# Patient Record
Sex: FEMALE | Race: WHITE | Employment: FULL TIME | URBAN - METROPOLITAN AREA
[De-identification: names, ages, dates, MRNs, and addresses within clinical notes are randomized per-mention and may not be internally consistent; named-entity substitution may affect disease eponyms.]

---

## 2021-04-09 ENCOUNTER — HOSPITAL ENCOUNTER (EMERGENCY)
Age: 26
Discharge: HOME | End: 2021-04-09
Attending: EMERGENCY MEDICINE
Payer: COMMERCIAL

## 2021-04-09 ENCOUNTER — APPOINTMENT (OUTPATIENT)
Dept: GENERAL RADIOLOGY | Age: 26
End: 2021-04-09
Payer: COMMERCIAL

## 2021-04-09 VITALS
OXYGEN SATURATION: 99 % | TEMPERATURE: 97.5 F | RESPIRATION RATE: 20 BRPM | HEART RATE: 71 BPM | SYSTOLIC BLOOD PRESSURE: 140 MMHG | DIASTOLIC BLOOD PRESSURE: 101 MMHG

## 2021-04-09 DIAGNOSIS — F41.0 ANXIETY ATTACK: Primary | ICD-10-CM

## 2021-04-09 PROCEDURE — 6370000000 HC RX 637 (ALT 250 FOR IP): Performed by: STUDENT IN AN ORGANIZED HEALTH CARE EDUCATION/TRAINING PROGRAM

## 2021-04-09 PROCEDURE — 99283 EMERGENCY DEPT VISIT LOW MDM: CPT

## 2021-04-09 PROCEDURE — 71045 X-RAY EXAM CHEST 1 VIEW: CPT

## 2021-04-09 PROCEDURE — 93005 ELECTROCARDIOGRAM TRACING: CPT | Performed by: STUDENT IN AN ORGANIZED HEALTH CARE EDUCATION/TRAINING PROGRAM

## 2021-04-09 RX ORDER — LORAZEPAM 0.5 MG/1
0.5 TABLET ORAL ONCE
Status: COMPLETED | OUTPATIENT
Start: 2021-04-09 | End: 2021-04-09

## 2021-04-09 RX ADMIN — LORAZEPAM 0.5 MG: 0.5 TABLET ORAL at 12:27

## 2021-04-09 ASSESSMENT — ENCOUNTER SYMPTOMS
BACK PAIN: 0
COUGH: 0
NAUSEA: 1
DIARRHEA: 0
ABDOMINAL PAIN: 0
SHORTNESS OF BREATH: 1
VOMITING: 0

## 2021-04-09 NOTE — ED PROVIDER NOTES
(abnormal) and her pulse is 108. Her respiration is 20 and oxygen saturation is 99%. Lungs are clear to auscultation bilateral, heart tachycardic with a regular rhythm, abdomen is soft nontender    Impression: Shortness of breath, hyperventilation    Plan: EKG, chest x-ray      EKG Interpretation    Interpreted by me  Sinus tachycardia ventricular heart rate, normal TX interval, normal QRS duration, no acute ST or T wave changes noted      (Please note that portions of this note were completed with a voice recognition program.  Efforts were made to edit the dictations but occasionally words are mis-transcribed.)    Ok Young MD, [facility]  Attending Emergency Medicine Physician        Cindy Christensen MD  04/09/21 2623

## 2021-04-09 NOTE — ED PROVIDER NOTES
[facility]  ED  Emergency Department Encounter  Emergency Medicine Resident     Pt Name: Nasrin Hernandez  MRN: [id number]  [place] 1995  Date of evaluation: 4/9/21  PCP:  Aren Branch MD    CHIEF COMPLAINT       Chief Complaint   Patient presents with    Chest Pain    Anxiety       HISTORY OFPRESENT ILLNESS  (Location/Symptom, Timing/Onset, Context/Setting, Quality, Duration, Modifying Sayda Simone.)      Nasrin Hernandez is a 23 y.o. female with past medical history asthma and anxiety presents to emergency department by EMS with complaint of diffuse body tingling/numbness and weakness as well as shortness of breath and chest pain. Patient states she was driving her car when she had onset of symptoms, she was able to finish driving home. When she got out of her car she was states she was too weak to walk into her house and fell to the street and then called EMS. Patient admitts to history of panic attacks but states this episode was unlike prior panic attacks. PAST MEDICAL / SURGICAL / SOCIAL / FAMILY HISTORY      has no past medical history on file. has no past surgical history on file.     Social History     Socioeconomic History    Marital status: Single     Spouse name: Not on file    Number of children: Not on file    Years of education: Not on file    Highest education level: Not on file   Occupational History    Not on file   Social Needs    Financial resource strain: Not on file    Food insecurity     Worry: Not on file     Inability: Not on file    Transportation needs     Medical: Not on file     Non-medical: Not on file   Tobacco Use    Smoking status: Not on file   Substance and Sexual Activity    Alcohol use: Not on file    Drug use: Not on file    Sexual activity: Not on file   Lifestyle    Physical activity     Days per week: Not on file     Minutes per session: Not on file    Stress: Not on file   Relationships    Social connections Talks on phone: Not on file     Gets together: Not on file     Attends Latter day service: Not on file     Active member of club or organization: Not on file     Attends meetings of clubs or organizations: Not on file     Relationship status: Not on file    Intimate partner violence     Fear of current or ex partner: Not on file     Emotionally abused: Not on file     Physically abused: Not on file     Forced sexual activity: Not on file   Other Topics Concern    Not on file   Social History Narrative    Not on file       No family history on file. Allergies:  Patient has no allergy information on record. Home Medications:  Prior to Admission medications    Not on File       REVIEW OF SYSTEMS    (2-9 systems for level 4, 10 or more for level 5)      Review of Systems   Constitutional: Negative for chills, fatigue and fever. Eyes: Negative for visual disturbance. Respiratory: Positive for shortness of breath. Negative for cough. Cardiovascular: Positive for chest pain. Gastrointestinal: Positive for nausea. Negative for abdominal pain, diarrhea and vomiting. Genitourinary: Negative for dysuria. Musculoskeletal: Negative for back pain and neck pain. Neurological: Positive for weakness and numbness. Negative for syncope, speech difficulty and headaches. Psychiatric/Behavioral: Negative for confusion. The patient is nervous/anxious. PHYSICAL EXAM   (up to 7 for level 4, 8 or more for level 5)     INITIAL VITALS:    temperature is 97.5 °F (36.4 °C). Her blood pressure is 140/101 (abnormal) and her pulse is 71. Her respiration is 20 and oxygen saturation is 99%. Physical Exam  Constitutional:       Comments: Patient is very anxious appearing upon entrance to room, tearful   HENT:      Nose: Nose normal.      Mouth/Throat:      Mouth: Mucous membranes are moist.      Pharynx: Oropharynx is clear. No oropharyngeal exudate. Eyes:      Extraocular Movements: Extraocular movements intact. Pupils: Pupils are equal, round, and reactive to light. Neck:      Musculoskeletal: Normal range of motion. No neck rigidity or muscular tenderness. Cardiovascular:      Rate and Rhythm: Normal rate and regular rhythm. Heart sounds: No murmur. Pulmonary:      Effort: Pulmonary effort is normal.      Breath sounds: Normal breath sounds. No wheezing. Musculoskeletal:         General: No tenderness or signs of injury. Right lower leg: No edema. Left lower leg: No edema. Skin:     General: Skin is warm and dry. Capillary Refill: Capillary refill takes less than 2 seconds. Findings: No lesion. Neurological:      Mental Status: She is alert. Comments: Patient is alert and oriented to person, place, time. GCS 15. Patient has pupillary reflex present bilaterally, intact extraocular motions intact bilaterally. Patient complains of subjective numbness left forehead and right cheek, symmetrical smile, tongue with intact range of motion, palate elevates symmetrically, facial muscles symmetric bilaterally. 5/5 strength bilateral upper and lower extremities. Patient reports sensation of numbness in bilateral hands   Psychiatric:      Comments: Patient is very anxious tearful         DIFFERENTIAL  DIAGNOSIS     PLAN (LABS / IMAGING / EKG):  Orders Placed This Encounter   Procedures    XR CHEST PORTABLE    EKG 12 Lead       MEDICATIONS ORDERED:  Orders Placed This Encounter   Medications    LORazepam (ATIVAN) tablet 0.5 mg       DDX: Anxiety, angina, ACS, asthma exacerbation, PE, bronchitis    Initial MDM/Plan: 23 y.o. female who presents with complaints of chest pain shortness of breath that started acutely while driving her car. She does have a history of asthma as well as anxiety, has had panic attacks in the past but states this feels unsimilar to prior episodes. Patient seen and examined.   She is tachycardic and tachypneic, is tearful and anxious appearing upon entrance history of panic attacks but states this episode was unlike prior panic attacks. Patient is anxious upon entrance to room, tearful, tachypneic.     [DS]   1229 Patient tachycardic, slightly tachypneic upon entrance to room. Does appear anxious. Patient has carpopedal spasm present bilaterally, subjective numbness in left forehead and right cheek. Suspect most likely anxiety attack, will perform EKG and chest x-ray, give dose of Ativan and reassess. [DS]   8754 Upon reassessment patient appears more comfortable, heart rate in the low 70s, oxygen saturation 98% on room air. EKG originally revealed sinus tachycardia, tachycardia has now resolved. [DS]   2231 Chest x-ray unremarkable, patient appears much more comfortable. Normal sinus rhythm, normotensive, will discharge home. [DS]      ED Course User Index  [DS] Hair Estrella DO         PROCEDURES:  None    CONSULTS:  None    CRITICAL CARE:  Please see attending note    FINAL IMPRESSION      1. Anxiety attack          DISPOSITION / PLAN     DISPOSITION Decision To Discharge 04/09/2021 02:50:23 PM        PATIENTREFERRED TO:  Pérez Kirkland MD  [address] [place], [place] [phone]    Call in 3 days  For recheck      DISCHARGE MEDICATIONS:  There are no discharge medications for this patient.       Hair Estrella DO  EmergencyMedicine Resident    (Please note that portions of this note were completed with a voice recognition program.  Efforts were made to edit the dictations but occasionally words are mis-transcribed.)        Hair Estrella DO  Resident  04/09/21 6196

## 2021-04-09 NOTE — ED NOTES
Bed: 35  Expected date:   Expected time:   Means of arrival:   Comments:  [address] [address], RN  04/09/21 1200

## 2021-04-10 LAB
EKG ATRIAL RATE: 108 BPM
EKG P AXIS: 71 DEGREES
EKG P-R INTERVAL: 112 MS
EKG Q-T INTERVAL: 324 MS
EKG QRS DURATION: 74 MS
EKG QTC CALCULATION (BAZETT): 434 MS
EKG R AXIS: 66 DEGREES
EKG T AXIS: 61 DEGREES
EKG VENTRICULAR RATE: 108 BPM

## 2021-07-15 ENCOUNTER — HOSPITAL ENCOUNTER (INPATIENT)
Age: 26
LOS: 3 days | Discharge: HOME | DRG: 885 | End: 2021-07-18
Attending: EMERGENCY MEDICINE | Admitting: PSYCHIATRY & NEUROLOGY
Payer: COMMERCIAL

## 2021-07-15 DIAGNOSIS — F32.A DEPRESSION WITH SUICIDAL IDEATION: Primary | ICD-10-CM

## 2021-07-15 DIAGNOSIS — R45.851 DEPRESSION WITH SUICIDAL IDEATION: Primary | ICD-10-CM

## 2021-07-15 LAB
ABSOLUTE EOS #: 0.1 K/UL (ref 0–0.4)
ABSOLUTE IMMATURE GRANULOCYTE: NORMAL K/UL (ref 0–0.3)
ABSOLUTE LYMPH #: 2.7 K/UL (ref 1–4.8)
ABSOLUTE MONO #: 0.4 K/UL (ref 0.1–1.3)
ACETAMINOPHEN LEVEL: <5 UG/ML (ref 10–30)
ALBUMIN SERPL-MCNC: 4.7 G/DL (ref 3.5–5.2)
ALBUMIN/GLOBULIN RATIO: ABNORMAL (ref 1–2.5)
ALP BLD-CCNC: 65 U/L (ref 35–104)
ALT SERPL-CCNC: 21 U/L (ref 5–33)
ANION GAP SERPL CALCULATED.3IONS-SCNC: 14 MMOL/L (ref 9–17)
AST SERPL-CCNC: 28 U/L
BASOPHILS # BLD: 1 % (ref 0–2)
BASOPHILS ABSOLUTE: 0.1 K/UL (ref 0–0.2)
BILIRUB SERPL-MCNC: 0.24 MG/DL (ref 0.3–1.2)
BUN BLDV-MCNC: 10 MG/DL (ref 6–20)
BUN/CREAT BLD: ABNORMAL (ref 9–20)
CALCIUM SERPL-MCNC: 9.2 MG/DL (ref 8.6–10.4)
CHLORIDE BLD-SCNC: 108 MMOL/L (ref 98–107)
CO2: 21 MMOL/L (ref 20–31)
CREAT SERPL-MCNC: 0.75 MG/DL (ref 0.5–0.9)
DIFFERENTIAL TYPE: NORMAL
EOSINOPHILS RELATIVE PERCENT: 1 % (ref 0–4)
ETHANOL PERCENT: 0.19 %
ETHANOL: 187 MG/DL
GFR AFRICAN AMERICAN: >60 ML/MIN
GFR NON-AFRICAN AMERICAN: >60 ML/MIN
GFR SERPL CREATININE-BSD FRML MDRD: ABNORMAL ML/MIN/{1.73_M2}
GFR SERPL CREATININE-BSD FRML MDRD: ABNORMAL ML/MIN/{1.73_M2}
GLUCOSE BLD-MCNC: 88 MG/DL (ref 70–99)
HCG QUALITATIVE: NEGATIVE
HCT VFR BLD CALC: 42.1 % (ref 36–46)
HEMOGLOBIN: 14.4 G/DL (ref 12–16)
IMMATURE GRANULOCYTES: NORMAL %
LYMPHOCYTES # BLD: 34 % (ref 24–44)
MCH RBC QN AUTO: 33.8 PG (ref 26–34)
MCHC RBC AUTO-ENTMCNC: 34.2 G/DL (ref 31–37)
MCV RBC AUTO: 98.9 FL (ref 80–100)
MONOCYTES # BLD: 6 % (ref 1–7)
NRBC AUTOMATED: NORMAL PER 100 WBC
PDW BLD-RTO: 11.9 % (ref 11.5–14.9)
PLATELET # BLD: 338 K/UL (ref 150–450)
PLATELET ESTIMATE: NORMAL
PMV BLD AUTO: 7.5 FL (ref 6–12)
POTASSIUM SERPL-SCNC: 3.7 MMOL/L (ref 3.7–5.3)
RBC # BLD: 4.26 M/UL (ref 4–5.2)
RBC # BLD: NORMAL 10*6/UL
SALICYLATE LEVEL: <1 MG/DL (ref 3–10)
SARS-COV-2, RAPID: NOT DETECTED
SEG NEUTROPHILS: 58 % (ref 36–66)
SEGMENTED NEUTROPHILS ABSOLUTE COUNT: 4.5 K/UL (ref 1.3–9.1)
SODIUM BLD-SCNC: 143 MMOL/L (ref 135–144)
SPECIMEN DESCRIPTION: NORMAL
TOTAL PROTEIN: 7.7 G/DL (ref 6.4–8.3)
WBC # BLD: 7.7 K/UL (ref 3.5–11)
WBC # BLD: NORMAL 10*3/UL

## 2021-07-15 PROCEDURE — 80179 DRUG ASSAY SALICYLATE: CPT

## 2021-07-15 PROCEDURE — 80143 DRUG ASSAY ACETAMINOPHEN: CPT

## 2021-07-15 PROCEDURE — 80053 COMPREHEN METABOLIC PANEL: CPT

## 2021-07-15 PROCEDURE — 6370000000 HC RX 637 (ALT 250 FOR IP): Performed by: EMERGENCY MEDICINE

## 2021-07-15 PROCEDURE — 85025 COMPLETE CBC W/AUTO DIFF WBC: CPT

## 2021-07-15 PROCEDURE — 84703 CHORIONIC GONADOTROPIN ASSAY: CPT

## 2021-07-15 PROCEDURE — 93005 ELECTROCARDIOGRAM TRACING: CPT | Performed by: EMERGENCY MEDICINE

## 2021-07-15 PROCEDURE — 99285 EMERGENCY DEPT VISIT HI MDM: CPT

## 2021-07-15 PROCEDURE — 87635 SARS-COV-2 COVID-19 AMP PRB: CPT

## 2021-07-15 PROCEDURE — 80307 DRUG TEST PRSMV CHEM ANLYZR: CPT

## 2021-07-15 PROCEDURE — G0480 DRUG TEST DEF 1-7 CLASSES: HCPCS

## 2021-07-15 PROCEDURE — 1240000000 HC EMOTIONAL WELLNESS R&B

## 2021-07-15 RX ORDER — POLYETHYLENE GLYCOL 3350 17 G/17G
17 POWDER, FOR SOLUTION ORAL DAILY PRN
Status: DISCONTINUED | OUTPATIENT
Start: 2021-07-15 | End: 2021-07-18 | Stop reason: HOSPADM

## 2021-07-15 RX ORDER — ONDANSETRON 4 MG/1
4 TABLET, ORALLY DISINTEGRATING ORAL ONCE
Status: COMPLETED | OUTPATIENT
Start: 2021-07-15 | End: 2021-07-15

## 2021-07-15 RX ORDER — ACETAMINOPHEN 325 MG/1
650 TABLET ORAL EVERY 4 HOURS PRN
Status: DISCONTINUED | OUTPATIENT
Start: 2021-07-15 | End: 2021-07-18 | Stop reason: HOSPADM

## 2021-07-15 RX ORDER — CETIRIZINE HYDROCHLORIDE 10 MG/1
10 TABLET ORAL DAILY
Status: ON HOLD | COMMUNITY
End: 2021-07-18 | Stop reason: HOSPADM

## 2021-07-15 RX ORDER — TRAZODONE HYDROCHLORIDE 50 MG/1
50 TABLET ORAL NIGHTLY PRN
Status: DISCONTINUED | OUTPATIENT
Start: 2021-07-16 | End: 2021-07-18 | Stop reason: HOSPADM

## 2021-07-15 RX ORDER — DICYCLOMINE HYDROCHLORIDE 10 MG/1
10 CAPSULE ORAL 2 TIMES DAILY
Status: ON HOLD | COMMUNITY
End: 2021-07-18 | Stop reason: HOSPADM

## 2021-07-15 RX ORDER — ALBUTEROL SULFATE 90 UG/1
2 AEROSOL, METERED RESPIRATORY (INHALATION) EVERY 6 HOURS PRN
COMMUNITY

## 2021-07-15 RX ORDER — IBUPROFEN 400 MG/1
400 TABLET ORAL EVERY 6 HOURS PRN
Status: DISCONTINUED | OUTPATIENT
Start: 2021-07-15 | End: 2021-07-18 | Stop reason: HOSPADM

## 2021-07-15 RX ORDER — MAGNESIUM HYDROXIDE/ALUMINUM HYDROXICE/SIMETHICONE 120; 1200; 1200 MG/30ML; MG/30ML; MG/30ML
30 SUSPENSION ORAL EVERY 6 HOURS PRN
Status: DISCONTINUED | OUTPATIENT
Start: 2021-07-15 | End: 2021-07-18 | Stop reason: HOSPADM

## 2021-07-15 RX ORDER — AMITRIPTYLINE HYDROCHLORIDE 25 MG/1
25 TABLET, FILM COATED ORAL NIGHTLY
Status: ON HOLD | COMMUNITY
End: 2021-07-18 | Stop reason: HOSPADM

## 2021-07-15 RX ORDER — HYDROXYZINE 50 MG/1
50 TABLET, FILM COATED ORAL 3 TIMES DAILY PRN
Status: DISCONTINUED | OUTPATIENT
Start: 2021-07-15 | End: 2021-07-18 | Stop reason: HOSPADM

## 2021-07-15 RX ADMIN — ONDANSETRON 4 MG: 4 TABLET, ORALLY DISINTEGRATING ORAL at 17:58

## 2021-07-15 NOTE — ED PROVIDER NOTES
EMERGENCY DEPARTMENT ENCOUNTER    Pt Name: Eleuterio Moura  MRN: [id number]  [place] 1995  Date of evaluation: 7/15/21  CHIEF COMPLAINT       Chief Complaint   Patient presents with    Mental Health Problem     HISTORY OF PRESENT ILLNESS     Mental Health Problem  Presenting symptoms: suicide attempt    Presenting symptoms comment:  Took four zanaflex pills after her boyfriend broke up with her, then she immediately vomited them up  Patient accompanied by:  [place] enforcement  Onset quality:  Sudden  Timing:  Constant  Chronicity:  New  Context comment:  Marijuana  Treatment compliance:  Untreated  Relieved by:  Nothing  Worsened by:  Nothing  Ineffective treatments:  None tried  Associated symptoms: anxiety and poor judgment    she did drink alcohol today        REVIEW OF SYSTEMS     Review of Systems   Psychiatric/Behavioral: The patient is nervous/anxious. All other systems reviewed and are negative. PASTMEDICAL HISTORY     Past Medical History:   Diagnosis Date    Anxiety     Asthma     IBS (irritable bowel syndrome)      Past Problem List  There is no problem list on file for this patient. SURGICAL HISTORY       Past Surgical History:   Procedure Laterality Date    WISDOM TOOTH EXTRACTION       CURRENT MEDICATIONS       Previous Medications    ALBUTEROL SULFATE HFA (VENTOLIN HFA) 108 (90 BASE) MCG/ACT INHALER    Inhale 2 puffs into the lungs every 6 hours as needed for Wheezing    AMITRIPTYLINE (ELAVIL) 25 MG TABLET    Take 25 mg by mouth nightly    CETIRIZINE (ZYRTEC) 10 MG TABLET    Take 10 mg by mouth daily    DICYCLOMINE (BENTYL) 10 MG CAPSULE    Take 10 mg by mouth 2 times daily     ALLERGIES     has No Known Allergies. FAMILY HISTORY     has no family status information on file. SOCIAL HISTORY       Social History     Tobacco Use    Smoking status: Never Smoker    Smokeless tobacco: Never Used   Substance Use Topics    Alcohol use: Yes     Comment: social     Drug use:  Yes Types: Marijuana     PHYSICAL EXAM     INITIAL VITALS: BP (!) 127/90   Pulse 115   Temp 98 °F (36.7 °C) (Oral)   Resp 15   SpO2 98%    Physical Exam  Constitutional:       General: She is not in acute distress. Appearance: Normal appearance. She is well-developed. She is not diaphoretic. HENT:      Head: Normocephalic and atraumatic. Right Ear: External ear normal.      Left Ear: External ear normal.      Nose: Nose normal. No congestion. Mouth/Throat:      Mouth: Mucous membranes are moist.      Pharynx: Oropharynx is clear. Eyes:      General:         Right eye: No discharge. Left eye: No discharge. Conjunctiva/sclera: Conjunctivae normal.      Pupils: Pupils are equal, round, and reactive to light. Neck:      Trachea: No tracheal deviation. Cardiovascular:      Rate and Rhythm: Normal rate and regular rhythm. Pulses: Normal pulses. Heart sounds: Normal heart sounds. Pulmonary:      Effort: Pulmonary effort is normal. No respiratory distress. Breath sounds: Normal breath sounds. No stridor. No wheezing or rales. Abdominal:      Palpations: Abdomen is soft. Tenderness: There is no abdominal tenderness. There is no guarding or rebound. Musculoskeletal:         General: No tenderness or deformity. Normal range of motion. Cervical back: Normal range of motion and neck supple. Skin:     General: Skin is warm and dry. Capillary Refill: Capillary refill takes less than 2 seconds. Findings: No erythema or rash. Neurological:      General: No focal deficit present. Mental Status: She is alert and oriented to person, place, and time. Cranial Nerves: No cranial nerve deficit. Coordination: Coordination normal.   Psychiatric:         Mood and Affect: Mood normal.         Behavior: Behavior normal.         Thought Content:  Thought content normal.         Judgment: Judgment normal.         MEDICAL DECISION MAKING:   Reviewed labs  5:47 PM EDT  poison center, patient is clear from their standpoint for [facility]  6:03 PM EDT  Medically clear for [facility] admit       Procedures    DIAGNOSTIC RESULTS   EKG:All EKG's are interpreted by the Emergency Department Physician who either signs or Co-signs this chart in the absence of a cardiologist.  ST, rate 105, normal intervals, no ischemic st changes    LABS: All lab results were reviewed by myself, and all abnormals are listed below. Labs Reviewed   ACETAMINOPHEN LEVEL - Abnormal; Notable for the following components:       Result Value    Acetaminophen Level <5 (*)     All other components within normal limits   COMPREHENSIVE METABOLIC PANEL - Abnormal; Notable for the following components:    Chloride 108 (*)     Total Bilirubin 0.24 (*)     All other components within normal limits   ETHANOL - Abnormal; Notable for the following components:    Ethanol 187 (*)     All other components within normal limits   SALICYLATE LEVEL - Abnormal; Notable for the following components:    Salicylate Lvl <1 (*)     All other components within normal limits   COVID-19, RAPID   CBC WITH AUTO DIFFERENTIAL   HCG, SERUM, QUALITATIVE   URINE DRUG SCREEN       EMERGENCY DEPARTMENTCOURSE:         Vitals:    Vitals:    07/15/21 1627   BP: (!) 127/90   Pulse: 115   Resp: 15   Temp: 98 °F (36.7 °C)   TempSrc: Oral   SpO2: 98%       The patient was given the following medications while in the emergency department:  Orders Placed This Encounter   Medications    ondansetron (ZOFRAN-ODT) disintegrating tablet 4 mg       FINAL IMPRESSION      1. Depression with suicidal ideation          DISPOSITION/PLAN   DISPOSITION Decision To Admit 07/15/2021 05:18:52 PM      PATIENT REFERRED TO:  No follow-up provider specified.   DISCHARGE MEDICATIONS:  New Prescriptions    No medications on file     Ernestina Grimaldo MD  Attending Emergency Physician                    Ernestina Grimaldo MD  07/15/21 0669

## 2021-07-15 NOTE — ED TRIAGE NOTES
Mode of arrival (squad #, walk in, police, etc) : Police        Chief complaint(s): Mental health problem         Arrival Note (brief scenario, treatment PTA, etc). : Pt states that her boyfriend broke up with her. Pt states the relationship was very toxic and she is very upset. Pt states that she took 7 muscle relaxers that are prescribed to her in an attempt to hurt herself. She states she did have an episode of emesis right after taking them. The pt states that her father called the police due to her saying \"I'm done. \" Pt is tearful in triage. Pt is A&Ox4, in no acute distress, respirations even and unlabored, ambulatory with steady gait. C= \"Have you ever felt that you should Cut down on your drinking? \"  No  A= \"Have people Annoyed you by criticizing your drinking? \"  No  G= \"Have you ever felt bad or Guilty about your drinking? \"  No  E= \"Have you ever had a drink as an Eye-opener first thing in the morning to steady your nerves or to help a hangover? \"  No      Deferred []      Reason for deferring: N/A    *If yes to two or more: probable alcohol abuse. *

## 2021-07-15 NOTE — PROGRESS NOTES
Medication History completed: All medications added this encounter. Medications confirmed with [address] [place] [address]. The patient reports that she has not taken her dicyclomine or amitriptyline in several days due to nightmares. The patient reports cannabis use with last use today.      Thank you,  Kade Portillo, PharmD, BCPS  [phone]

## 2021-07-16 PROBLEM — F33.2 MAJOR DEPRESSIVE DISORDER, RECURRENT SEVERE WITHOUT PSYCHOTIC FEATURES (HCC): Status: ACTIVE | Noted: 2021-07-16

## 2021-07-16 LAB
EKG ATRIAL RATE: 105 BPM
EKG P AXIS: 64 DEGREES
EKG P-R INTERVAL: 126 MS
EKG Q-T INTERVAL: 340 MS
EKG QRS DURATION: 78 MS
EKG QTC CALCULATION (BAZETT): 449 MS
EKG R AXIS: 57 DEGREES
EKG T AXIS: 66 DEGREES
EKG VENTRICULAR RATE: 105 BPM

## 2021-07-16 PROCEDURE — 93010 ELECTROCARDIOGRAM REPORT: CPT | Performed by: INTERNAL MEDICINE

## 2021-07-16 PROCEDURE — 99223 1ST HOSP IP/OBS HIGH 75: CPT | Performed by: PSYCHIATRY & NEUROLOGY

## 2021-07-16 PROCEDURE — APPSS60 APP SPLIT SHARED TIME 46-60 MINUTES

## 2021-07-16 PROCEDURE — 6370000000 HC RX 637 (ALT 250 FOR IP): Performed by: PSYCHIATRY & NEUROLOGY

## 2021-07-16 PROCEDURE — 6370000000 HC RX 637 (ALT 250 FOR IP)

## 2021-07-16 PROCEDURE — 1240000000 HC EMOTIONAL WELLNESS R&B

## 2021-07-16 RX ORDER — ALBUTEROL SULFATE 90 UG/1
2 AEROSOL, METERED RESPIRATORY (INHALATION) EVERY 6 HOURS PRN
Status: DISCONTINUED | OUTPATIENT
Start: 2021-07-16 | End: 2021-07-18 | Stop reason: HOSPADM

## 2021-07-16 RX ORDER — BUSPIRONE HYDROCHLORIDE 10 MG/1
10 TABLET ORAL 2 TIMES DAILY
Status: DISCONTINUED | OUTPATIENT
Start: 2021-07-16 | End: 2021-07-18 | Stop reason: HOSPADM

## 2021-07-16 RX ADMIN — HYDROXYZINE HYDROCHLORIDE 50 MG: 50 TABLET, FILM COATED ORAL at 09:04

## 2021-07-16 RX ADMIN — IBUPROFEN 400 MG: 400 TABLET, FILM COATED ORAL at 09:33

## 2021-07-16 RX ADMIN — ALBUTEROL SULFATE 2 PUFF: 90 AEROSOL, METERED RESPIRATORY (INHALATION) at 16:59

## 2021-07-16 RX ADMIN — BUSPIRONE HYDROCHLORIDE 10 MG: 10 TABLET ORAL at 21:38

## 2021-07-16 RX ADMIN — TRAZODONE HYDROCHLORIDE 50 MG: 50 TABLET ORAL at 21:38

## 2021-07-16 RX ADMIN — ALBUTEROL SULFATE 2 PUFF: 90 AEROSOL, METERED RESPIRATORY (INHALATION) at 21:54

## 2021-07-16 ASSESSMENT — PAIN DESCRIPTION - PAIN TYPE: TYPE: ACUTE PAIN

## 2021-07-16 ASSESSMENT — SLEEP AND FATIGUE QUESTIONNAIRES
SLEEP PATTERN: RESTLESSNESS
DO YOU HAVE DIFFICULTY SLEEPING: YES
AVERAGE NUMBER OF SLEEP HOURS: 5
DO YOU USE A SLEEP AID: NO

## 2021-07-16 ASSESSMENT — PAIN SCALES - GENERAL: PAINLEVEL_OUTOF10: 6

## 2021-07-16 ASSESSMENT — PAIN DESCRIPTION - DESCRIPTORS: DESCRIPTORS: CRAMPING

## 2021-07-16 ASSESSMENT — LIFESTYLE VARIABLES
HISTORY_ALCOHOL_USE: YES
HISTORY_ALCOHOL_USE: NO

## 2021-07-16 ASSESSMENT — PAIN DESCRIPTION - LOCATION: LOCATION: ABDOMEN

## 2021-07-16 NOTE — PROGRESS NOTES
Behavioral Services  Medicare Certification Upon Admission    I certify that this patient's inpatient psychiatric hospital admission is medically necessary for:    [x] (1) Treatment which could reasonably be expected to improve this patient's condition,       [x] (2) Or for diagnostic study;     AND     [x](2) The inpatient psychiatric services are provided while the individual is under the care of a physician and are included in the individualized plan of care.     Estimated length of stay/service -5 to 9 days    Plan for post-hospital care -outpatient care    Electronically signed by Dominga Chisholm MD on 7/16/2021 at 1:37 PM

## 2021-07-16 NOTE — GROUP NOTE
Group Therapy Note    Date: 7/16/2021    Group Start Time: 1100  Group End Time: 4718  Group Topic: Cognitive Skills    [facility]    Eulalia Quintanilla, IVELISSES        Group Therapy Note    Attendees:7         Patient's Goal:  To irmprove decision making skills improve interactions with peers    Notes:   Pt was pleasant and participated well     Status After Intervention:  Improved    Participation Level:  Active Listener and Interactive    Participation Quality: Appropriate, Attentive, Sharing and Supportive      Speech:  normal      Thought Process/Content: Logical      Affective Functioning: Congruent      Mood: euthymic      Level of consciousness:  Alert, Oriented x4 and Attentive      Response to Learning: Able to verbalize current knowledge/experience, Able to verbalize/acknowledge new learning and Progressing to goal      Endings: None Reported    Modes of Intervention: Education, Support and Problem-solving      Discipline Responsible: Psychoeducational Specialist      Signature:  Annie Moura

## 2021-07-16 NOTE — PLAN OF CARE
[facility]  Initial Interdisciplinary Treatment Plan NO      Original treatment plan Date & Time: 7/16/2021 0800    Admission Type:  Admission Type: Voluntary    Reason for admission:   Reason for Admission: OD on muscle relaxants, drinking alcohol    Estimated Length of Stay:  5-7days  Estimated Discharge Date: to be determined by physician    PATIENT STRENGTHS:  Patient Strengths:Strengths: Communication, Employment, Positive Support, Social Skills, No significant Physical Illness  Patient Strengths and Limitations:Limitations: External locus of control  Addictive Behavior: Addictive Behavior  In the past 3 months, have you felt or has someone told you that you have a problem with:  : None  Do you have a history of Chemical Use?: No  Do you have a history of Alcohol Use?: No  Do you have a history of Street Drug Abuse?: Yes  Histroy of Prescripton Drug Abuse?: No  Medical Problems:  Past Medical History:   Diagnosis Date    Anxiety     Asthma     IBS (irritable bowel syndrome)      Status EXAM:Status and Exam  Normal: Yes  Facial Expression: Flat  Affect: Appropriate  Level of Consciousness: Alert  Mood:Normal: Yes  Motor Activity:Normal: Yes  Interview Behavior: Cooperative  Preception: [place] to Person, [place] to Time, [place] to Place, [place] to Situation  Attention:Normal: Yes  Thought Content:Normal: Yes  Hallucinations: None  Delusions: No  Memory:Normal: Yes  Insight and Judgment: No  Insight and Judgment: Poor Judgment, Poor Insight  Present Suicidal Ideation: No  Present Homicidal Ideation: No    EDUCATION:   Learner Progress Toward Treatment Goals: reviewed group plans and strategies for care    Method:group therapy, medication compliance, individualized assessments and care planning    Outcome: needs reinforcement    PATIENT GOALS: to be discussed with patient within 72 hours    PLAN/TREATMENT RECOMMENDATIONS:     continue group therapy , medications compliance, goal setting, individualized assessments and care, continue to monitor pt on unit      SHORT-TERM GOALS:   Time frame for Short-Term Goals: 5-7 days    LONG-TERM GOALS:  Time frame for Long-Term Goals: 6 months  Members Present in Team Meeting: See Signature Sheet    ROSA Peacock

## 2021-07-16 NOTE — GROUP NOTE
Group Therapy Note    Date: 7/16/2021    Group Start Time: 1330  Group End Time: 3794  Group Topic: Recreational    [facility] CHARISMAI ANETTE Power, CTRS        Group Therapy Note    Attendees: 5         Patient's Goal: To increase interpersonal interactions. To increase creative thinking. Notes:  Patient attended group and actively participated. Patient was appropriate and pleasant. Status After Intervention:  Improved    Participation Level:  Active Listener and Interactive    Participation Quality: Appropriate, Attentive and Sharing      Speech:  normal      Thought Process/Content: Logical      Affective Functioning: Congruent      Mood: euthymic      Level of consciousness:  Alert, Oriented x4 and Attentive      Response to Learning: Able to verbalize current knowledge/experience and Progressing to goal      Endings: None Reported    Modes of Intervention: Support, Socialization, Activity, Media and Reality-testing      Discipline Responsible: Psychoeducational Specialist      Signature:  Maine Israel

## 2021-07-16 NOTE — CARE COORDINATION
[facility] Biopsychosocial Assessment    Current Level of Psychosocial Functioning     Independent   Dependent    Minimal Assist X    Psychosocial High Risk Factors (check all that apply)    Unable to obtain meds   Chronic illness/pain    Substance abuse X Marijuana   Lack of Family Support   Financial stress   Isolation X  Inadequate Community Resources X  Suicide attempt(s) X   Not taking medications X   Victim of crime   Developmental Delay  Unable to manage personal needs  X   Age 72 or older   Homeless  No transportation   Readmission within 30 days  Unemployment  Traumatic Event     Psychiatric Advanced Directives: none reported     Family to Involve in Treatment: Pt reports that both her mother and father are supportive and involved in her care. Sexual Orientation: Heterosexual      Patient Strengths:employed full time, insurance, family support    Patient Barriers: presents on admission following an attempted overdose on muscle relaxer's, not linked with HC, not receiving Psychiatric medications. .   Opiate Education Provided:N/A Pt denies and does not have a documented history of Opiate or Heroin use/abuse. Pt reports Marijuana and Alcohol use. CMHC/mental health history: Pt is not [place] [facility], she is agreeable to being linked with a [facility] in the area at discharge. Plan of Care   medication management, group/individual therapies, family meetings, psycho -education, treatment team meetings to assist with stabilization    Initial Discharge Plan: Pt reports a plan to return to her home in [place]. Pt also reports a plan to follow up with outpatient Treatment at a [facility] [place] the area at discharge. Clinical Summary:  Patient is a 23year old  female who presents on admission [place]  Enforcement after she told her father \"I'm done\". Patient also reported that she took 7 muscle relaxer's.   Patient was intoxicated upon arrival, however upon sobriety, patient is very tearful and upset regarding recent break up with her boyfriend. Patient did report throwing the medication up after ingestion. Pt Ethanol level on admission to ED was . 187.      Patient denies ever having suicidal thoughts or attempting to harm herself. She indicated that she took the pills to \"try and make him care\" (referring to her ex boyfriend). Patient stated she was frustrated and hurt and wanted to get his attention. Patient denies any past suicide attempts. Patient denies hallucinations. Patient lives with her roommate and feels safe with this person. Patient also has supportive family, and reports that both of her mother and father are supportive of her. 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 Patient denies symptoms of depression, states that the overdose attempt, was just a way to get her boyfriend to care about her. Patient states that she took Flexeril, 5 mg tablets approximately 7 of them while drinking vodka and cranberry juice. Patient's BAL upon arrival to the hospital was 0.187. Throughout the conversation, patient continues to deny symptoms of depression.   Pt is not linked with a [facility], she is agreeable to being linked with a [facility] in the area at discharge.

## 2021-07-16 NOTE — GROUP NOTE
Group Therapy Note    Date: 7/16/2021    Group Start Time: 1000  Group End Time: 5641  Group Topic: Psychotherapy    [facility]    Dorian Tan    Client is met on this date for either new client assessment or 1:1 talk time. Motivational Interviewing used as well as encouragement to participate in ongoing groups.

## 2021-07-16 NOTE — H&P
Department of Psychiatry  Attending Physician Psychiatric Assessment     Reason for Admission to Psychiatric Unit:    · Threat to self requiring 24 hour professional observation  · Acute disordered/bizarre behavior or psychomotor agitation or retardation;interferes with ADLs so that patient cannot function at a less intensive care level of care during evaluation and treatment   · A mental disorder causing major disability in social, interpersonal, occupational, and/or educational functioning that is leading to dangerous or life-threatening functioning, and that can only be addressed in an acute inpatient setting   · Concerns about patient's safety in the community    CHIEF COMPLAINT: Depression with suicidal ideation    History obtained from:  patient, electronic medical record and family members    HISTORY OF PRESENT ILLNESS:    Miguel Jara is a 23 y.o. female with significant past medical history of anxiety who presented to the ED post overdose attempt on Flexeril. Per ED report \"Patient is a 23year old  female who presented to ED via [address] after she told her father \"I'm done\". Patient also informed ED she took 7 muscle relaxer's in an attempt to harm herself. Patient was intoxicated upon arrival, however upon sobriety, patient is very tearful and upset regarding recent break up. Patient stated her ex boyfriend \"told me not to, so I did\", in regards to taking the pills. She indicated it \"was stupid but I was pissed\". Patient did report throwing the medication up after ingestion.   Patient denies SI upon sobriety. She indicated that she took the pills to \"try and make him care\" (referring to her ex boyfriend). Patient stated she was frustrated and hurt and had \"a break down\". Patient denies SI and has never attempted suicide before. Patient denies HI. Patient denies halluciantions.   Patient stated she did not take the medications in an attempt to harm or kill herself.   Patient lives with her roommate and feels safe with this person. She feels as though this person is \"someone she can always trust\". Patient also has supportive family, and stated her father is \"my best friend\". 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 Patient reports she has great coworkers and administration staff. Patient is worried about missing work due to [employer] a stupid mistake\". Upon arrival to the unit, patient has been behavioral controlled. Patient has not required the use of any as needed medication for agitation or anxiety. Today, patient was interviewed in the intake room. Patient was cooperative with writer. Patient states that she stayed over at her boyfriend's last night, and that when she woke up this morning her boyfriend said that she should just take her stuff and go home. Patient states that her boyfriend wanted her out of the house and stated \"we should just be friends from here on out\". Patient states that they have been dated for approximately 8 months, but understands that he is not a really good person and that she should probably get out of the relationship. Patient denies symptoms of depression, states that the overdose attempt, was just a way to get her boyfriend to care about her. Patient states that she took Flexeril, 5 mg tablets approximately 7 of them while drinking vodka and cranberry juice. Patient's BAL upon arrival to the hospital was 0.187. Throughout the conversation, patient continues to deny symptoms of depression. Patient does endorse anxiety. States that her current anxiety level is a 6 out of 10 on a daily basis. Patient states that there are certain things that we will set her off and that she is good at talking herself down, however states that today she was not able to do that.  Patient states that she recently was given medication for anxiety, states that she cannot remember the name of the medications however states that she stopped taking it because it was giving her hallucinations. Patient states that she follows with Dr. Neli De La Torre. Patient states that she does get panic attacks and that she was recently admitted to the hospital for hyperventilating during a panic attack. Patient endorses symptoms of borderline personality disorder such as fears abandonment, unstable relationships, intense angry outbursts, labile mood and impulsivity. Patient states that while she was in high school she was bullied patient also states that while she was in high school that her class made a play about her with all of her bad qualities that the entire high school knew that the play was about her. At this time, patient is discharged focused, states that this was a stupid mistake, however admits that she took the medication as an attention seeking behavior to get her boyfriend to care about her. PSYCHIATRIC HISTORY:  yes -anxiety  Currently follows with her PCP  0 lifetime suicide attempts  0 psychiatric hospital admissions    Past psychiatric medications includes: Unable to remember the name    Adverse reactions from psychotropic medications: yes -states that she got auditory hallucinations from anxiety medication    Lifetime Psychiatric Review of Systems         Slime or Hypomania: denies      Panic Attacks: Endorses     Phobias: denies     Obsessions and Compulsions:denies     Body or Vocal Tics:  denies     Hallucinations:denies     Delusions: persecutory    Past Medical History:        Diagnosis Date    Anxiety     Asthma     IBS (irritable bowel syndrome)        Past Surgical History:        Procedure Laterality Date    WISDOM TOOTH EXTRACTION         Allergies:  Patient has no known allergies. Social History:     [employer].  LEVEL OF EDUCATION: High school diploma  MARITAL STATUS: Previously in a relationship with a gentleman for approximately 8 months, recently broke up. CHILDREN: None  OCCUPATION: Works as a  at a healthcare facility  RESIDENCE: Currently lives in her own apartment with a roommate  PATIENT ASSETS: Stable housing, supportive family    DRUG USE HISTORY  Social History     Tobacco Use   Smoking Status Never Smoker   Smokeless Tobacco Never Used     Social History     Substance and Sexual Activity   Alcohol Use Yes    Comment: social      Social History     Substance and Sexual Activity   Drug Use Yes    Types: Marijuana     Alcohol use, denies daily drinking, states that she typically only drinks on the weekends    States that she uses marijuana daily for sleep and appetite      LEGAL HISTORY:   HISTORY OF INCARCERATION: no     Family History:   History reviewed. No pertinent family history. Psychiatric Family History  No insight    PHYSICAL EXAM:  Vitals:  BP (!) 152/97   Pulse 88   Temp 98.7 °F (37.1 °C) (Oral)   Resp 16   Ht 5' 6\" (1.676 m)   Wt 190 lb (86.2 kg)   SpO2 98%   BMI 30.67 kg/m²      Review of Systems   Constitutional: Negative for chills and weight loss. HENT: Negative for ear pain and nosebleeds. Eyes: Negative for blurred vision and photophobia. Respiratory: Negative for cough, shortness of breath and wheezing. Cardiovascular: Negative for chest pain and palpitations. Gastrointestinal: Negative for abdominal pain, diarrhea and vomiting. Genitourinary: Negative for dysuria and urgency. Musculoskeletal: Negative for falls and joint pain. Skin: Negative for itching and rash. Neurological: Negative for tremors, seizures and weakness. Endo/Heme/Allergies: Does not bruise/bleed easily. Physical Exam:      Constitutional:  Appears well-developed and well-nourished, no acute distress  HENT:   Head: Normocephalic and atraumatic. Eyes: Conjunctivae are normal. Right eye exhibits no discharge. Left eye exhibits no discharge. No scleral icterus.    Neck: Normal range of motion. Neck supple. Pulmonary/Chest:  No respiratory distress or accessory muscle use, no wheezing. Abdominal: Soft. Exhibits no distension. Musculoskeletal: Normal range of motion. Exhibits no edema. Neurological: cranial nerves II-XII grossly in tact, normal gait and station  Skin: Skin is warm and dry. Patient is not diaphoretic. No erythema.          Mental Status Examination:    Level of consciousness:  within normal limits   Appearance:  Hospital attire, seated on the side of bed, fair grooming   Behavior/Motor: no abnormalities noted  Attitude toward examiner:  Cooperative  Speech: normal rate and volume  Mood:  Depressed  Affect:  blunted  Thought processes:   Circumstantial  Thought content: active suicidal ideations without current plan or intent               denies homicidal ideations               Denies hallucinations              denies delusions  Cognition:  Oriented to self, location, time, situation  Concentration clinically adequate  Memory: intact  Insight &Judgment: poor    DSM-5 Diagnosis  Major depressive disorder, recurrent, severe, without psychosis  Generalized anxiety disorder  Cannabis use disorder    Psychosocial and Contextual factors:  Financial  Occupational  Relationship  Legal   Living situation  Educational     Past Medical History:   Diagnosis Date    Anxiety     Asthma     IBS (irritable bowel syndrome)         TREATMENT PLAN  Start Prozac 10 mg  Attempt to develop insight  Psycho-education conducted  Supportive Therapy conducted  Follow up daily while on inpatient unit  Encourage patient to participate in milieu activities    Risk Management:  close watch per standard protocol      Psychotherapy:  participation in milieu and group and individual sessions with Attending Physician,  and Physician Assistant/CNP      Estimated length of stay:  2-14 days      GENERAL PATIENT/FAMILY EDUCATION  Patient will understand basic signs and symptoms, Patient will understand benefits/risks and potential side effects from proposed meds and Patient will understand their role in recovery. Family is  active in patient's care. Patient assets that may be helpful during treatment include: Intent to participate and engage in treatment, sufficient fund of knowledge and intellect to understand and utilize treatments. Goals:    1) Remission of suicidal ideations  2) Stabilization of symptoms prior to discharge. 3) Establish efficacy and tolerability of medications. Behavioral Services  Medicare Certification     Admission Day 1  I certify that this patient's inpatient psychiatric hospital admission is medically necessary for:    x (1) treatment which could reasonably be expected to improve this patient's condition, or    x (2) diagnostic study or its equivalent. Time Spent: 60 minutes     Physicians Signature:  Electronically signed by ALEIDA Jama CNP on 7/16/21 at 3:13 AM EDT  I independently saw and evaluated the patient. I reviewed the midlevel provider's documentation above. Any additional comments or changes to the midlevel provider's documentation are stated below otherwise agree with assessment. The patient reports that she is generally happy person but she has anxiety problems. The patient recently had a difficult break-up. Her boyfriend broke up with her and told her not to do anything stupid. She decided to take an overdose to go against his advice. She took 7 muscle relaxants that she has left. 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 She likes her job. The patient uses marijuana often to help with sleep. She does not use other recreational substances. Her alcohol use is social    The patient has been treated with Zoloft in the past.  She got hallucinations from this.     The patient denies any past history of suicide attempts or admissions to hospital.    The patient would like medications for anxiety but she wants to avoid the medication that because of side effects in the past.    The patient states that she had an IUD which is recently been removed. She has been bleeding heavily. We will start the patient on buspirone 10 mg twice daily. She can use Atarax as needed for anxiety. Will avoid SSRIs due to previous adverse reaction. PLAN  Medications as noted above  Attempt to develop insight  Psycho-education conducted. Supportive Therapy conducted.   Probable discharge is 5 to 7 days  Follow-up daily while on inpatient unit    Electronically signed by Tai Martins MD on 7/16/21 at 1:38 PM EDT

## 2021-07-16 NOTE — PLAN OF CARE
Problem: Depressive Behavior With or Without Suicide Precautions:  Goal: Able to verbalize and/or display a decrease in depressive symptoms  Description: Able to verbalize and/or display a decrease in depressive symptoms  7/16/2021 1716 by Cassie Claudio. Cameron Ramirez LPN  Outcome: Ongoing   Chayo Wang is seen In the dayroom, affect is flat but mood is brightened, Denies any suicidal thoughts, reports some anxiety. Denies any issues with sleep or appetite. States she was just upset over breakup with boyfriend, has good family support.  15 minute safety check continued

## 2021-07-16 NOTE — ED NOTES
Provisional Diagnosis:     Patient presented to ED via [employer] for a suicide attempt    Psychosocial and Contextual Factors:     Patient recently broke up with boyfriend    C-SSRS Summary:    Patient denies upon sobriety    Patient: X  Family:   Agency:     Substance Abuse:  Patient reports marijuana use to help her sleep and increase appetite. Present Suicidal Behavior:    Patient reports she took 7 muscle relaxer's in an attempt to harm herself. Patient also told her father \"I'm done\". Upon sobriety, patient denies SI. Verbal: X    Attempt: X    Past Suicidal Behavior:   Patient denies    Verbal:    Attempt:    Self-Injurious/Self-Mutilation:  Patient denies    Trauma Identified:    Patient recently broke up with her boyfriend    Protective Factors:    Patient has supportive friends and family. Patient has stable housing. Patient has stable employment. Patient has insurance. Risk Factors:    Patient going through break up. Patient not linked with [address] provider. Patient not taking medications. Clinical Summary:    Patient is a 23year old  female who presented to ED via [facility] after she told her father \"I'm done\". Patient also informed ED she took 7 muscle relaxer's in an attempt to harm herself. Patient was intoxicated upon arrival, however upon sobriety, patient is very tearful and upset regarding recent break up. Patient stated her ex boyfriend \"told me not to, so I did\", in regards to taking the pills. She indicated it \"was stupid but I was pissed\". Patient did report throwing the medication up after ingestion. Patient denies SI upon sobriety. She indicated that she took the pills to \"try and make him care\" (referring to her ex boyfriend). Patient stated she was frustrated and hurt and had \"a break down\". Patient denies SI and has never attempted suicide before. Patient denies HI. Patient denies halluciantions.   Patient stated she did not take the medications in an attempt to harm or kill herself. Patient lives with her roommate and feels safe with this person. She feels as though this person is \"someone she can always trust\". Patient also has supportive family, and stated her father is \"my best friend\". 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 Patient reports she has great coworkers and administration staff. Patient is worried about missing work due to [employer] a stupid mistake\". Level of Care Disposition: This writer consulted with Radha Sandoval NP, who recommended inpatient hospitalization for safety and stabilization. Patient signed application for voluntary admission to [facility].

## 2021-07-17 PROCEDURE — 6370000000 HC RX 637 (ALT 250 FOR IP): Performed by: PSYCHIATRY & NEUROLOGY

## 2021-07-17 PROCEDURE — 99232 SBSQ HOSP IP/OBS MODERATE 35: CPT | Performed by: NURSE PRACTITIONER

## 2021-07-17 PROCEDURE — 1240000000 HC EMOTIONAL WELLNESS R&B

## 2021-07-17 PROCEDURE — 6370000000 HC RX 637 (ALT 250 FOR IP)

## 2021-07-17 RX ADMIN — BUSPIRONE HYDROCHLORIDE 10 MG: 10 TABLET ORAL at 22:03

## 2021-07-17 RX ADMIN — ALBUTEROL SULFATE 2 PUFF: 90 AEROSOL, METERED RESPIRATORY (INHALATION) at 22:14

## 2021-07-17 RX ADMIN — ALBUTEROL SULFATE 2 PUFF: 90 AEROSOL, METERED RESPIRATORY (INHALATION) at 08:38

## 2021-07-17 RX ADMIN — TRAZODONE HYDROCHLORIDE 50 MG: 50 TABLET ORAL at 22:03

## 2021-07-17 RX ADMIN — HYDROXYZINE HYDROCHLORIDE 50 MG: 50 TABLET, FILM COATED ORAL at 22:03

## 2021-07-17 RX ADMIN — BUSPIRONE HYDROCHLORIDE 10 MG: 10 TABLET ORAL at 08:38

## 2021-07-17 RX ADMIN — ALBUTEROL SULFATE 2 PUFF: 90 AEROSOL, METERED RESPIRATORY (INHALATION) at 15:57

## 2021-07-17 NOTE — PLAN OF CARE
[address] [place] [address]  Day 3 Interdisciplinary Treatment Plan NOTE    Review Date & Time: 7/17/2021                    1300    Admission Type:   Admission Type: Involuntary    Reason for admission:  Reason for Admission: SI no plan  Estimated Length of Stay: 5-7 days  Estimated Discharge Date Update: to be determined by physician    PATIENT STRENGTHS:  Patient Strengths Strengths: Positive Support, No significant Physical Illness  Patient Strengths and Limitations:Limitations: Tendency to isolate self, Lacks leisure interests, Difficulty problem solving/relies on others to help solve problems, Hopeless about future, Multiple barriers to leisure interests, Inappropriate/potentially harmful leisure interests (depression substance abuse anxiety poor coping skills)  Addictive Behavior:Addictive Behavior  In the past 3 months, have you felt or has someone told you that you have a problem with:  : None  Do you have a history of Chemical Use?: No  Do you have a history of Alcohol Use?: No  Do you have a history of Street Drug Abuse?: Yes  Histroy of Prescripton Drug Abuse?: No  Medical Problems:No past medical history on file.     Risk:  Fall RiskTotal: 53  Pascual Scale Pascual Scale Score: 22  BVC Total: 0  Change in scores no Changes to plan of Care no    Status EXAM:   Status and Exam  Normal: No  Facial Expression: Elevated  Affect: Inappropriate  Level of Consciousness: Alert  Mood:Normal: No  Mood: Depressed, Anxious  Motor Activity:Normal: No  Motor Activity: Decreased  Interview Behavior: Cooperative  Preception: [place] to Person, [place] to Time, [place] to Place, [place] to Situation  Attention:Normal: Yes  Attention: Distractible  Thought Processes: Circumstantial  Thought Content:Normal: Yes  Thought Content: Preoccupations  Hallucinations: None  Delusions: No  Memory:Normal: Yes  Memory: Poor Recent, Confabulation  Insight and Judgment: No  Insight and Judgment: Unmotivated  Present Suicidal Ideation: No  Present Homicidal Ideation: No    Daily Assessment Last Entry:   Daily Sleep (WDL): Within Defined Limits         Patient Currently in Pain: No  Daily Nutrition (WDL): Within Defined Limits    Patient Monitoring:  Frequency of Checks: 4 times per hour, close    Psychiatric Symptoms:   Depression Symptoms  Depression Symptoms: Isolative, Loss of interest  Anxiety Symptoms  Anxiety Symptoms: Generalized  Slime Symptoms  Slime Symptoms: No problems reported or observed. Psychosis Symptoms  Delusion Type: No problems reported or observed.     Suicide Risk CSSR-S:  Have you wished you were dead or wished you could go to sleep and not wake up? : NO  Have you actually had any thoughts of killing yourself? : NO  Have you ever done anything, started to do anything, or prepared to do anything to end your life?: NO  Change in Result                no                Change in Plan of care             no      EDUCATION:   EDUCATION:   Learner Progress Toward Treatment Goals: Reviewed results and recommendations of this team, Reviewed group plan and strategies, Reviewed signs, symptoms and risk of self harm and violent behavior, Reviewed goals and plan of care    Method:small group, individual verbal education    Outcome:verbalized by patient, but needs reinforcement to obtain goals    PATIENT GOALS:  Short term: \"decrease co-dependent behaviors -identify resources to support process\"  Long term: \" talk to a professional regularly, follow up\"    PLAN/TREATMENT RECOMMENDATIONS UPDATE: continue with group therapies, increased socialization, continue planning for after discharge goals, continue with medication compliance    SHORT-TERM GOALS UPDATE:   Time frame for Short-Term Goals: 5-7 days    LONG-TERM GOALS UPDATE:   Time frame for Long-Term Goals: 6 months  Members Present in Team Meeting: See Signature Sheet    Shelly Rangel

## 2021-07-17 NOTE — PROGRESS NOTES
Daily Progress Note  7/17/2021    Patient Name: Regino Hastings:  Depression with suicidal ideation         SUBJECTIVE:      Patient is seen today for a follow up assessment. Patient has been medication compliant. She reports an improvement today. She states her mood is \"great. \"  She reports an improvement in depression and suicidal ideations. Patient reports receiving a full night of restorative sleep last night and feels rested today. She does report anxiety, but states this is chronic for her. She is active in group and milieu activities, and reports that she has made acquaintances that she engages with throughout the day. She also states that she enjoys the group activities and feels they are beneficial for her. Appetite:  [x] Normal/Adequate/Unchanged  [] Increased  [] Decreased      Sleep:       [x] Normal/Adequate/Unchanged  [] Fair  [] Poor      Group Attendance on Unit:   [x] Yes  [] Selectively    [] No    Medication Side Effects: denies         Mental Status Exam  Level of consciousness: Alert and awake   Appearance: Appropriate attire for setting, seated in chair, with fair  grooming and hygiene   Behavior/Motor: Approachable, no psychomotor abnormalities   Attitude toward examiner: Cooperative, attentive, good eye contact  Speech: spontaneous, normal rate and normal volume   Mood:  Patient reports \"great\". Patient presents as Euthymic  Affect: mood congruent  Thought processes: linear, goal directed and coherent   Thought content: Denies homicidal ideation  Suicidal Ideation: Reports improvement in suicidal ideations,  contracts for safety on the unit. Delusions: No evidence of delusions. Denies paranoia. Perceptual Disturbance: Denies auditory and visual hallucinations   Cognition: Oriented to self, location, time, and situation  Memory: intact  Insight & Judgement: fair     Data   height is 5' 6\" (1.676 m) and weight is 190 lb (86.2 kg).  Her oral temperature is 99.1 °F (37.3 °C). Her blood pressure is 140/88 (abnormal) and her pulse is 89. Her respiration is 14 and oxygen saturation is 98%.    Labs:   Admission on 07/15/2021   Component Date Value Ref Range Status    Acetaminophen Level 07/15/2021 <5* 10 - 30 ug/mL Final    WBC 07/15/2021 7.7  3.5 - 11.0 k/uL Final    RBC 07/15/2021 4.26  4.0 - 5.2 m/uL Final    Hemoglobin 07/15/2021 14.4  12.0 - 16.0 g/dL Final    Hematocrit 07/15/2021 42.1  36 - 46 % Final    MCV 07/15/2021 98.9  80 - 100 fL Final    MCH 07/15/2021 33.8  26 - 34 pg Final    MCHC 07/15/2021 34.2  31 - 37 g/dL Final    RDW 07/15/2021 11.9  11.5 - 14.9 % Final    Platelets 80/37/4088 338  150 - 450 k/uL Final    MPV 07/15/2021 7.5  6.0 - 12.0 fL Final    NRBC Automated 07/15/2021 NOT REPORTED  per 100 WBC Final    Differential Type 07/15/2021 NOT REPORTED   Final    Seg Neutrophils 07/15/2021 58  36 - 66 % Final    Lymphocytes 07/15/2021 34  24 - 44 % Final    Monocytes 07/15/2021 6  1 - 7 % Final    Eosinophils % 07/15/2021 1  0 - 4 % Final    Basophils 07/15/2021 1  0 - 2 % Final    Immature Granulocytes 07/15/2021 NOT REPORTED  0 % Final    Segs Absolute 07/15/2021 4.50  1.3 - 9.1 k/uL Final    Absolute Lymph # 07/15/2021 2.70  1.0 - 4.8 k/uL Final    Absolute Mono # 07/15/2021 0.40  0.1 - 1.3 k/uL Final    Absolute Eos # 07/15/2021 0.10  0.0 - 0.4 k/uL Final    Basophils Absolute 07/15/2021 0.10  0.0 - 0.2 k/uL Final    Absolute Immature Granulocyte 07/15/2021 NOT REPORTED  0.00 - 0.30 k/uL Final    WBC Morphology 07/15/2021 NOT REPORTED   Final    RBC Morphology 07/15/2021 NOT REPORTED   Final    Platelet Estimate 72/08/4508 NOT REPORTED   Final    Glucose 07/15/2021 88  70 - 99 mg/dL Final    BUN 07/15/2021 10  6 - 20 mg/dL Final    CREATININE 07/15/2021 0.75  0.50 - 0.90 mg/dL Final    Bun/Cre Ratio 07/15/2021 NOT REPORTED  9 - 20 Final    Calcium 07/15/2021 9.2  8.6 - 10.4 mg/dL Final    Sodium 07/15/2021 143  135 - 144 mmol/L Final    Potassium 07/15/2021 3.7  3.7 - 5.3 mmol/L Final    Chloride 07/15/2021 108* 98 - 107 mmol/L Final    CO2 07/15/2021 21  20 - 31 mmol/L Final    Anion Gap 07/15/2021 14  9 - 17 mmol/L Final    Alkaline Phosphatase 07/15/2021 65  35 - 104 U/L Final    ALT 07/15/2021 21  5 - 33 U/L Final    AST 07/15/2021 28  <32 U/L Final    Total Bilirubin 07/15/2021 0.24* 0.3 - 1.2 mg/dL Final    Total Protein 07/15/2021 7.7  6.4 - 8.3 g/dL Final    Albumin 07/15/2021 4.7  3.5 - 5.2 g/dL Final    Albumin/Globulin Ratio 07/15/2021 NOT REPORTED  1.0 - 2.5 Final    GFR Non- 07/15/2021 >60  >60 mL/min Final    GFR  07/15/2021 >60  >60 mL/min Final    GFR Comment 07/15/2021        Final    Comment: Average GFR for 20-28 years old:   116 mL/min/1.73sq m  Chronic Kidney Disease:   <60 mL/min/1.73sq m  Kidney failure:   <15 mL/min/1.73sq m              eGFR calculated using average adult body mass. Additional eGFR calculator available at:        [employer]            GFR Staging 07/15/2021 NOT REPORTED   Final    Ethanol 07/15/2021 187* <10 mg/dL Final    Ethanol percent 07/15/2021 0.187  % Final    hCG Qual 07/15/2021 NEGATIVE  NEGATIVE Final    Comment: Specimens with hCG levels near the threshold of the test (25 mIU/mL) may give a negative or   indeterminate result. In such cases, another test should be performed with a new specimen   in 48-72 hours. If early pregnancy is suspected clinically in this setting, correlation   with quantitative serum b-hCG level is suggested.       Salicylate Lvl 22/30/7584 <1* 3 - 10 mg/dL Final    Ventricular Rate 07/15/2021 105  BPM Final    Atrial Rate 07/15/2021 105  BPM Final    P-R Interval 07/15/2021 126  ms Final    QRS Duration 07/15/2021 78  ms Final    Q-T Interval 07/15/2021 340  ms Final    QTc Calculation (Bazett) 07/15/2021 449  ms Final    P Axis 07/15/2021 64  degrees Final  R Axis 07/15/2021 57  degrees Final    T Axis 07/15/2021 66  degrees Final    Specimen Description 07/15/2021 . NASOPHARYNGEAL SWAB   Final    SARS-CoV-2, Rapid 07/15/2021 Not Detected  Not Detected Final    Comment:       Rapid NAAT:  The specimen is NEGATIVE for SARS-CoV-2, the novel coronavirus associated with   COVID-19. The ID NOW COVID-19 assay is designed to detect the virus that causes COVID-19 in patients   with signs and symptoms of infection who are suspected of COVID-19. An individual without symptoms of COVID-19 and who is not shedding SARS-CoV-2 virus would   expect to have a negative (not detected) result in this assay. Negative results should be treated as presumptive and, if inconsistent with clinical signs   and symptoms or necessary for patient management,  should be tested with an alternative molecular assay. Negative results do not preclude   SARS-CoV-2 infection and   should not be used as the sole basis for patient management decisions. Fact sheet for Healthcare Providers: [employer]  Fact sheet for Patients: [employer]          Methodology: Isothermal Nucleic Acid Amplification           Reviewed patient's current plan of care and vital signs with nursing staff.     Labs reviewed: [x] Yes  Last EKG in EMR reviewed: [x] Yes    Medications  Current Facility-Administered Medications: busPIRone (BUSPAR) tablet 10 mg, 10 mg, Oral, BID  albuterol sulfate  (90 Base) MCG/ACT inhaler 2 puff, 2 puff, Inhalation, Q6H PRN  acetaminophen (TYLENOL) tablet 650 mg, 650 mg, Oral, Q4H PRN  ibuprofen (ADVIL;MOTRIN) tablet 400 mg, 400 mg, Oral, Q6H PRN  polyethylene glycol (GLYCOLAX) packet 17 g, 17 g, Oral, Daily PRN  aluminum & magnesium hydroxide-simethicone (MAALOX) 200-200-20 MG/5ML suspension 30 mL, 30 mL, Oral, Q6H PRN  hydrOXYzine (ATARAX) tablet 50 mg, 50 mg, Oral, TID PRN  traZODone (DESYREL) tablet 50 mg, 50 mg, Oral, Nightly PRN    ASSESSMENT  Major depressive disorder, recurrent severe without psychotic features ([facility] [address]         PLAN  Patient symptoms are: Improving  Continue current medication regimen. Monitor need and frequency of PRN medications. Encourage participation in groups and milieu. Attempt to develop insight. Psycho-education conducted. Supportive Therapy conducted. Probable discharge is to be determined by MD.   Follow-up daily while inpatient. Patient continues to be monitored in the inpatient psychiatric facility at [facility] for safety and stabilization. Patient continues to need, on a daily basis, active treatment furnished directly by or requiring the supervision of inpatient psychiatric personnel. Electronically signed by ALEIDA Rodriguez CNP on 7/17/2021 at 4:02 PM    **This report has been created using voice recognition software. It may contain minor errors which are inherent in voice recognition technology. **

## 2021-07-17 NOTE — PLAN OF CARE
Problem: Altered Mood, Depressive Behavior:  Goal: Ability to disclose and discuss suicidal ideas will improve  Description: Ability to disclose and discuss suicidal ideas will improve  Outcome: Ongoing  Goal: Absence of self-harm  Description: Absence of self-harm  Outcome: Ongoing     Problem: Depressive Behavior With or Without Suicide Precautions:  Goal: Able to verbalize and/or display a decrease in depressive symptoms  Description: Able to verbalize and/or display a decrease in depressive symptoms  7/17/2021 0131 by Mikayla Arauz RN  Outcome: Ongoing   Patient remains in the day area social with peers and accepting of staff assessment and medication this shift. Patient displays a very bright affect, denies suicidal homicidal ideation. No unsafe behavior or outbursts this evening.

## 2021-07-17 NOTE — GROUP NOTE
Group Therapy Note    Date: 7/17/2021    Group Start Time: 0845  Group End Time: 0915  Group Topic: Community Meeting    [facility]ET[facility]ason, [address]         Group Therapy Note    Attendees: 8/20            Patient's Goal:  To increase social interaction and to explore and identify short term goals r/t wellness and recovery. RT discussed group schedule and unit structure/resources and encouraged pts to be engaged in their treatment. Pts were given opportunities to ask questions. Notes: Pt participated in group task and was able to identify short term goals r/t wellness and recovery. Pt is pleasant and supportive of peers        Status After Intervention:  Improved     Participation Level:  Active Listener and Interactive     Participation Quality: Appropriate, Attentive, Sharing         Speech:   Normal     Thought Process/Content: Logical,linear     Affective Functioning: Congruent     Mood: euthymic        Level of consciousness:  Alert, and Attentive        Response to Learning: Able to verbalize current knowledge/experience, Able to verbalize/acknowledge new learning, and Progressing to goal        Endings: None Reported     Modes of Intervention: Education, Support, Socialization, Exploration, Clarifying and Problem-solving        Discipline Responsible: Psychoeducational Specialist        Signature:  Sai Stewart

## 2021-07-17 NOTE — PLAN OF CARE
Problem: Altered Mood, Depressive Behavior:  Goal: Ability to disclose and discuss suicidal ideas will improve  Description: Ability to disclose and discuss suicidal ideas will improve  7/17/2021 1252 by Ayleen Owen LPN  Outcome: Ongoing  Note: Pt denies thoughts of self harm and is agreeable to seeking out should thoughts of self harm arise. Patient is participating in programming and selectively social on the unit. Safe environment maintained. Every 15 minute checks for safety cont per unit policy. Will cont to monitor for safety and provides support and reassurance as needed.         Problem: Depressive Behavior With or Without Suicide Precautions:  Goal: Able to verbalize and/or display a decrease in depressive symptoms  Description: Able to verbalize and/or display a decrease in depressive symptoms  7/17/2021 1252 by Ayleen Owen LPN  Outcome: Ongoing

## 2021-07-18 VITALS
RESPIRATION RATE: 14 BRPM | DIASTOLIC BLOOD PRESSURE: 82 MMHG | WEIGHT: 190 LBS | HEART RATE: 87 BPM | OXYGEN SATURATION: 98 % | HEIGHT: 66 IN | TEMPERATURE: 98.6 F | SYSTOLIC BLOOD PRESSURE: 128 MMHG | BODY MASS INDEX: 30.53 KG/M2

## 2021-07-18 PROCEDURE — 99238 HOSP IP/OBS DSCHRG MGMT 30/<: CPT | Performed by: PSYCHIATRY & NEUROLOGY

## 2021-07-18 PROCEDURE — 6370000000 HC RX 637 (ALT 250 FOR IP): Performed by: PSYCHIATRY & NEUROLOGY

## 2021-07-18 RX ORDER — TRAZODONE HYDROCHLORIDE 50 MG/1
50 TABLET ORAL NIGHTLY PRN
Qty: 30 TABLET | Refills: 0 | Status: SHIPPED | OUTPATIENT
Start: 2021-07-18

## 2021-07-18 RX ORDER — HYDROXYZINE 50 MG/1
50 TABLET, FILM COATED ORAL 3 TIMES DAILY PRN
Qty: 30 TABLET | Refills: 0 | Status: SHIPPED | OUTPATIENT
Start: 2021-07-18 | End: 2021-07-28

## 2021-07-18 RX ORDER — BUSPIRONE HYDROCHLORIDE 10 MG/1
10 TABLET ORAL 2 TIMES DAILY
Qty: 60 TABLET | Refills: 0 | Status: SHIPPED | OUTPATIENT
Start: 2021-07-18

## 2021-07-18 RX ADMIN — ALBUTEROL SULFATE 2 PUFF: 90 AEROSOL, METERED RESPIRATORY (INHALATION) at 09:04

## 2021-07-18 RX ADMIN — BUSPIRONE HYDROCHLORIDE 10 MG: 10 TABLET ORAL at 09:04

## 2021-07-18 NOTE — BH NOTE
[facility]  Discharge Note    Pt discharged with followings belongings:   Dentures: None  Vision - Corrective Lenses: Contacts  Hearing Aid: None  Jewelry: Ring, Earrings, Bracelet  Body Piercings Removed: No  Clothing: Footwear, Pants, Shirt  Were All Patient Medications Collected?: Yes  Other Valuables: None   Valuables sent home with patient. or returned to patient. Patient education on aftercare instructions.   Information faxed to (will fax tomorrow when follow up is placed) by [address] [place], [place] [address]  at 12:14 PM .Patient verbalize understanding of AVS.    Status EXAM upon discharge:  Status and Exam  Normal: Yes  Facial Expression: Brightened  Affect: Appropriate  Level of Consciousness: Alert  Mood:Normal: No  Mood: Anxious  Motor Activity:Normal: Yes  Interview Behavior: Cooperative  Preception: [place] to Person, [place] to Time, [place] to Place, [place] to Situation  Attention:Normal: Yes  Thought Content:Normal: Yes  Hallucinations: None  Delusions: No  Memory:Normal: Yes  Insight and Judgment: No  Insight and Judgment: Poor Insight  Present Suicidal Ideation: No  Present Homicidal Ideation: No      Metabolic Screening:    No results found for: LABA1C    No results found for: CHOL  No results found for: TRIG  No results found for: HDL  No components found for: LDLCAL  No results found for: Tres Norwood RN    Patient discharged home with dad picking her up

## 2021-07-18 NOTE — GROUP NOTE
Group Therapy Note    Date: 7/17/2021    Group Start Time: 2040  Group End Time: 2115  Group Topic: Wrap-Up    [facility]    Tarsha Carrington RN      Group Therapy Note    Attendees: 17/21    Patient's Goal:    1. To be able to reflect on daily unit activities/experiences. 2.  To review accomplished daily goals and be encouraged to set new goals for the next day. 3.  To improve interpersonal interaction through socialization. Notes:  Pt attended and actively participated in Wrap-Up (Goal Review) group this evening. Status After Intervention:  Improved     Participation Level:  Active Listener and Interactive     Participation Quality: Appropriate, Attentive and Sharing     Speech:  normal     Thought Process/Content: Logical     Affective Functioning: Congruent     Mood: euthymic     Level of consciousness:  Alert, Oriented x4 and Attentive     Response to Learning: Able to verbalize current knowledge/experience, Able to verbalize/acknowledge new learning     Endings: None Reported     Modes of Intervention: Education, Support and Socialization     Discipline Responsible: Registered Nurse        Signature:  Tarsha Carrington RN

## 2021-07-18 NOTE — DISCHARGE SUMMARY
Provider Discharge Summary     Patient ID:  Zenia Portillo  [id number]  89 y.o.  1995    Admit date: 7/15/2021    Discharge date and time: 7/18/2021  6:30 PM     Admitting Physician: Luanne Peabody, MD     Discharge Physician: Manuela Napier MD    Admission Diagnoses: Depression with suicidal ideation [F32.9, R45.851]    Discharge Diagnoses:      Major depressive disorder, recurrent severe without psychotic features [facility])     Patient Active Problem List   Diagnosis Code    Depression with suicidal ideation F32.9, R45.851    Major depressive disorder, recurrent severe without psychotic features ([facility] [address] F33.2        Admission Condition: poor    Discharged Condition: stable    Indication for Admission: threat to self    History of Present Illnes (present tense wording is of findings from admission exam and are not necessarily indicative of current findings):   Zenia Portillo is a 23 y.o. female with significant past medical history of anxiety who presented to the ED post overdose attempt on Flexeril.  Per ED report \"Patient is a 23year old  female who presented to ED via [address] after she told her father \"I'm done\".  Patient also informed ED she took 7 muscle relaxer's in an attempt to harm herself. Woody Redd was intoxicated upon arrival, however upon sobriety, patient is very tearful and upset regarding recent break up. Woody Redd stated her ex boyfriend \"told me not to, so I did\", in regards to taking the pills.  She indicated it \"was stupid but I was pissed\".  Patient did report throwing the medication up after ingestion.   Patient denies SI upon sobriety.  She indicated that she took the pills to \"try and make him care\" (referring to her ex boyfriend).  Patient stated she was frustrated and hurt and had \"a break down\".  Patient denies SI and has never attempted suicide before.  Patient denies HI.  Patient denies halluciantions.  Patient stated she did not take the medications in an attempt to harm or kill herself.  Yany Hastings lives with her roommate and feels safe with this person. Daniela Silveira feels as though this person is \"someone she can always trust\".  Patient also has supportive family, and stated her father is \"my best friend\".  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  Patient reports she has great coworkers and administration staff. Yany Hastings is worried about missing work due to [place] a stupid mistake\".    Upon arrival to the unit, patient has been behavioral controlled. Patient has not required the use of any as needed medication for agitation or anxiety.     Today, patient was interviewed in the intake room. Patient was cooperative with writer. Patient states that she stayed over at her boyfriend's last night, and that when she woke up this morning her boyfriend said that she should just take her stuff and go home. Patient states that her boyfriend wanted her out of the house and stated \"we should just be friends from here on out\". Patient states that they have been dated for approximately 8 months, but understands that he is not a really good person and that she should probably get out of the relationship.     Patient denies symptoms of depression, states that the overdose attempt, was just a way to get her boyfriend to care about her. Patient states that she took Flexeril, 5 mg tablets approximately 7 of them while drinking vodka and cranberry juice. Patient's BAL upon arrival to the hospital was 0.187. Throughout the conversation, patient continues to deny symptoms of depression.     Patient does endorse anxiety. States that her current anxiety level is a 6 out of 10 on a daily basis. Patient states that there are certain things that we will set her off and that she is good at talking herself down, however states that today she was not able to do that.  Patient states that she recently was given medication for anxiety, states that she cannot remember the name of the medications however states that she stopped taking it because it was giving her hallucinations. Patient states that she follows with Dr. Bindu Sigala. Patient states that she does get panic attacks and that she was recently admitted to the hospital for hyperventilating during a panic attack.     Patient endorses symptoms of borderline personality disorder such as fears abandonment, unstable relationships, intense angry outbursts, labile mood and impulsivity. Patient states that while she was in high school she was bullied patient also states that while she was in high school that her class made a play about her with all of her bad qualities that the entire high school knew that the play was about her.     At this time, patient is discharged focused, states that this was a stupid mistake, however admits that she took the medication as an attention seeking behavior to get her boyfriend to care about her.     Hospital Course:   Upon admission, Neelam Lorenz was provided a safe secure environment, introduced to unit milieu. Patient participated in groups and individual therapies. Meds were adjusted as noted below. After few days of hospital care, patient began to feel improvement. Depression lifted, thoughts to harm self ceased. Sleep improved, appetite was good. On morning rounds 7/18/2021, Neelam Lorenz  endorses feeling ready for discharge. Patient denies suicidal or homicidal ideations, denies hallucinations or delusions. Denies SE's from meds. It was decided that maximum benefit from hospital care had been achieved and patient can be discharged. Consults:   No consults    Significant Diagnostic Studies: Routine labs and diagnostics    Treatments: Psychotropic medications, therapy with group, milieu, and 1:1 with nurses, social workers, PALI/CNP, and Attending physician.       Discharge Medications:  Discharge Medication List as of 7/18/2021 12:12 PM      START taking these medications    Details   busPIRone (BUSPAR) 10 MG tablet Take 1 tablet by mouth 2 times daily, Disp-60 tablet, R-0Normal      hydrOXYzine (ATARAX) 50 MG tablet Take 1 tablet by mouth 3 times daily as needed for Anxiety, Disp-30 tablet, R-0Normal      traZODone (DESYREL) 50 MG tablet Take 1 tablet by mouth nightly as needed for Sleep, Disp-30 tablet, R-0Normal         CONTINUE these medications which have NOT CHANGED    Details   albuterol sulfate HFA (VENTOLIN HFA) 108 (90 Base) MCG/ACT inhaler Inhale 2 puffs into the lungs every 6 hours as needed for WheezingHistorical Med         STOP taking these medications       amitriptyline (ELAVIL) 25 MG tablet Comments:   Reason for Stopping:         dicyclomine (BENTYL) 10 MG capsule Comments:   Reason for Stopping:         cetirizine (ZYRTEC) 10 MG tablet Comments:   Reason for Stopping:                    Core Measures statement:   Not applicable    Discharge Exam:  Level of consciousness:  Within normal limits  Appearance: Street clothes, seated, with good grooming  Behavior/Motor: No abnormalities noted  Attitude toward examiner:  Cooperative, attentive, good eye contact  Speech:  spontaneous, normal rate, normal volume and well articulated  Mood:  euthymic  Affect:  Full range  Thought processes:  linear, goal directed and coherent  Thought content:  denies homicidal ideation  Suicidal Ideation:  denies suicidal ideation  Delusions:  no evidence of delusions  Perceptual Disturbance:  denies any perceptual disturbance  Cognition:  Intact  Memory: age appropriate  Insight & Judgement: fair  Medication side effects: denies     Disposition: home    Patient Instructions: Activity: activity as tolerated  1. Patient instructed to take medications regularly and follow up with outpatient appointments.      Follow-up as scheduled with outpatient [facility] mental health      Signed:    Electronically signed by Jordon Kahn MD on 7/18/21 at 6:30 PM EDT    Time Spent on discharge is less than 30 minutes in the examination, evaluation, counseling and review of medications and discharge plan.

## 2021-07-18 NOTE — CARE COORDINATION
SW met with pt to discuss discharge. Pt identified Farzad Reddy as a past therapist and would like to re link with her. Pt would like to go to [place] or [facility] for doctor follow-up, Pt shared that father is thinking pt should go to [place] in [place]. Pt also asked that SW call pts dad's phone number [phone] once appointments are scheduled, pt shared possibly getting new phone when discharged today.

## 2021-07-18 NOTE — PLAN OF CARE
Problem: Altered Mood, Depressive Behavior:  Goal: Ability to disclose and discuss suicidal ideas will improve  Description: Ability to disclose and discuss suicidal ideas will improve  7/18/2021 0242 by Rabia Carlson RN  Outcome: Ongoing     Problem: Altered Mood, Depressive Behavior:  Goal: Absence of self-harm  Description: Absence of self-harm  7/18/2021 0242 by Rabia Carlson RN  Outcome: Ongoing     Problem: Depressive Behavior With or Without Suicide Precautions:  Goal: Able to verbalize and/or display a decrease in depressive symptoms  Description: Able to verbalize and/or display a decrease in depressive symptoms  7/18/2021 0242 by Rabia Carlson RN  Outcome: Ongoing     Patient remains in behavior control, brightened mood and affect. She denies hallucinations and depressive symptoms. Patient encouraged to no engage in physical contact with peers and remain at appropriate distance. Patient approaches staff later in shift, reports feeling anxious due to conversation with boyfriend. Prns given without incident. She remains free from harm to self and in a safe environment.

## 2021-07-18 NOTE — GROUP NOTE
Group Therapy Note    Date: 7/18/2021    Group Start Time: 0900  Group End Time: 0945  Group Topic: Community Meeting    DANIKA Ahmadi RN        Group Therapy Note    Attendees: 8/23         Patient's Goal:  Going Home today! Notes:  Patient participated in group and was social with others.      Status After Intervention:  Improved    Participation Level: Interactive    Participation Quality: Appropriate      Speech:  normal      Thought Process/Content: Logical      Affective Functioning: Congruent      Mood: euthymic      Level of consciousness:  Alert      Response to Learning: Able to verbalize current knowledge/experience      Endings: None Reported    Modes of Intervention: Problem-solving      Discipline Responsible: Registered Nurse      Signature:  Jermaine Ahmadi RN

## 2021-07-19 NOTE — CARE COORDINATION
Name: Mynor Forte    : 1995    Discharge Date:   Primary Auth/Cert #: [id number]    Destination: home     Discharge Medications:      Medication List      START taking these medications    busPIRone 10 MG tablet  Commonly known as: BUSPAR  Take 1 tablet by mouth 2 times daily  Notes to patient: For anxiety      hydrOXYzine 50 MG tablet  Commonly known as: ATARAX  Take 1 tablet by mouth 3 times daily as needed for Anxiety  Notes to patient: For anxiety     traZODone 50 MG tablet  Commonly known as: DESYREL  Take 1 tablet by mouth nightly as needed for Sleep  Notes to patient: For sleep        CONTINUE taking these medications    Ventolin  (90 Base) MCG/ACT inhaler  Generic drug: albuterol sulfate HFA  Notes to patient:  For wheezing        STOP taking these medications    amitriptyline 25 MG tablet  Commonly known as: ELAVIL     cetirizine 10 MG tablet  Commonly known as: ZYRTEC     dicyclomine 10 MG capsule  Commonly known as: BENTYL           Where to Get Your Medications      These medications were sent to [facility] [address], [place] [address] [place] [address]    Phone: [phone]   · busPIRone 10 MG tablet  · hydrOXYzine 50 MG tablet  · traZODone 50 MG tablet         Follow Up Appointment: [facility]   Radha Gupta [address]  [place], [address] [place] [address]    #[phone]    Social work will call pt with appointment

## 2022-01-07 ENCOUNTER — HOSPITAL ENCOUNTER (EMERGENCY)
Age: 27
Discharge: HOME | End: 2022-01-07
Attending: EMERGENCY MEDICINE
Payer: COMMERCIAL

## 2022-01-07 ENCOUNTER — APPOINTMENT (OUTPATIENT)
Dept: GENERAL RADIOLOGY | Age: 27
End: 2022-01-07
Payer: COMMERCIAL

## 2022-01-07 VITALS
RESPIRATION RATE: 20 BRPM | DIASTOLIC BLOOD PRESSURE: 84 MMHG | HEIGHT: 67 IN | SYSTOLIC BLOOD PRESSURE: 132 MMHG | WEIGHT: 180 LBS | OXYGEN SATURATION: 97 % | HEART RATE: 105 BPM | BODY MASS INDEX: 28.25 KG/M2 | TEMPERATURE: 99.3 F

## 2022-01-07 DIAGNOSIS — R07.9 CHEST PAIN, UNSPECIFIED TYPE: Primary | ICD-10-CM

## 2022-01-07 LAB
ABSOLUTE EOS #: 0.14 K/UL (ref 0–0.4)
ABSOLUTE IMMATURE GRANULOCYTE: 0 K/UL (ref 0–0.3)
ABSOLUTE LYMPH #: 2.48 K/UL (ref 1–4.8)
ABSOLUTE MONO #: 0.35 K/UL (ref 0.1–0.8)
ANION GAP SERPL CALCULATED.3IONS-SCNC: 14 MMOL/L (ref 9–17)
BASOPHILS # BLD: 0 % (ref 0–2)
BASOPHILS ABSOLUTE: 0 K/UL (ref 0–0.2)
BNP INTERPRETATION: NORMAL
BUN BLDV-MCNC: 11 MG/DL (ref 6–20)
BUN/CREAT BLD: ABNORMAL (ref 9–20)
CALCIUM SERPL-MCNC: 9.2 MG/DL (ref 8.6–10.4)
CHLORIDE BLD-SCNC: 102 MMOL/L (ref 98–107)
CO2: 20 MMOL/L (ref 20–31)
CREAT SERPL-MCNC: 0.7 MG/DL (ref 0.5–0.9)
D-DIMER QUANTITATIVE: <0.17 MG/L FEU
DIFFERENTIAL TYPE: ABNORMAL
EOSINOPHILS RELATIVE PERCENT: 2 % (ref 1–4)
GFR AFRICAN AMERICAN: >60 ML/MIN
GFR NON-AFRICAN AMERICAN: >60 ML/MIN
GFR SERPL CREATININE-BSD FRML MDRD: ABNORMAL ML/MIN/{1.73_M2}
GFR SERPL CREATININE-BSD FRML MDRD: ABNORMAL ML/MIN/{1.73_M2}
GLUCOSE BLD-MCNC: 118 MG/DL (ref 70–99)
HCT VFR BLD CALC: 40.7 % (ref 36.3–47.1)
HEMOGLOBIN: 14.5 G/DL (ref 11.9–15.1)
IMMATURE GRANULOCYTES: 0 %
LYMPHOCYTES # BLD: 36 % (ref 24–44)
MCH RBC QN AUTO: 33.4 PG (ref 25.2–33.5)
MCHC RBC AUTO-ENTMCNC: 35.6 G/DL (ref 28.4–34.8)
MCV RBC AUTO: 93.8 FL (ref 82.6–102.9)
MONOCYTES # BLD: 5 % (ref 1–7)
MORPHOLOGY: NORMAL
NRBC AUTOMATED: 0 PER 100 WBC
NUCLEATED RED BLOOD CELLS: 1 PER 100 WBC
PDW BLD-RTO: 11.6 % (ref 11.8–14.4)
PLATELET # BLD: 344 K/UL (ref 138–453)
PLATELET ESTIMATE: ABNORMAL
PMV BLD AUTO: 9.8 FL (ref 8.1–13.5)
POTASSIUM SERPL-SCNC: 3.7 MMOL/L (ref 3.7–5.3)
PRO-BNP: 24 PG/ML
RBC # BLD: 4.34 M/UL (ref 3.95–5.11)
RBC # BLD: ABNORMAL 10*6/UL
SEG NEUTROPHILS: 57 % (ref 36–66)
SEGMENTED NEUTROPHILS ABSOLUTE COUNT: 3.93 K/UL (ref 1.8–7.7)
SODIUM BLD-SCNC: 136 MMOL/L (ref 135–144)
TROPONIN INTERP: NORMAL
TROPONIN T: NORMAL NG/ML
TROPONIN, HIGH SENSITIVITY: <6 NG/L (ref 0–14)
WBC # BLD: 6.9 K/UL (ref 3.5–11.3)
WBC # BLD: ABNORMAL 10*3/UL

## 2022-01-07 PROCEDURE — 6370000000 HC RX 637 (ALT 250 FOR IP): Performed by: STUDENT IN AN ORGANIZED HEALTH CARE EDUCATION/TRAINING PROGRAM

## 2022-01-07 PROCEDURE — 80048 BASIC METABOLIC PNL TOTAL CA: CPT

## 2022-01-07 PROCEDURE — 93005 ELECTROCARDIOGRAM TRACING: CPT | Performed by: EMERGENCY MEDICINE

## 2022-01-07 PROCEDURE — 85025 COMPLETE CBC W/AUTO DIFF WBC: CPT

## 2022-01-07 PROCEDURE — 71045 X-RAY EXAM CHEST 1 VIEW: CPT

## 2022-01-07 PROCEDURE — 84484 ASSAY OF TROPONIN QUANT: CPT

## 2022-01-07 PROCEDURE — 99285 EMERGENCY DEPT VISIT HI MDM: CPT

## 2022-01-07 PROCEDURE — 85379 FIBRIN DEGRADATION QUANT: CPT

## 2022-01-07 PROCEDURE — 83880 ASSAY OF NATRIURETIC PEPTIDE: CPT

## 2022-01-07 RX ORDER — MECLIZINE HCL 12.5 MG/1
12.5 TABLET ORAL ONCE
Status: COMPLETED | OUTPATIENT
Start: 2022-01-07 | End: 2022-01-07

## 2022-01-07 RX ORDER — IBUPROFEN 400 MG/1
600 TABLET ORAL ONCE
Status: COMPLETED | OUTPATIENT
Start: 2022-01-07 | End: 2022-01-07

## 2022-01-07 RX ADMIN — MECLIZINE HCL 12.5 MG 12.5 MG: 12.5 TABLET ORAL at 18:02

## 2022-01-07 RX ADMIN — IBUPROFEN 600 MG: 400 TABLET, FILM COATED ORAL at 17:48

## 2022-01-07 ASSESSMENT — ENCOUNTER SYMPTOMS
SHORTNESS OF BREATH: 0
CONSTIPATION: 0
DIARRHEA: 0
BACK PAIN: 0
COUGH: 0
ABDOMINAL PAIN: 0
NAUSEA: 0
VOMITING: 0

## 2022-01-07 ASSESSMENT — PAIN DESCRIPTION - ORIENTATION: ORIENTATION: RIGHT;MID

## 2022-01-07 ASSESSMENT — PAIN DESCRIPTION - PROGRESSION: CLINICAL_PROGRESSION: NOT CHANGED

## 2022-01-07 ASSESSMENT — PAIN SCALES - GENERAL: PAINLEVEL_OUTOF10: 4

## 2022-01-07 ASSESSMENT — PAIN DESCRIPTION - ONSET: ONSET: ON-GOING

## 2022-01-07 ASSESSMENT — PAIN DESCRIPTION - LOCATION: LOCATION: CHEST

## 2022-01-07 ASSESSMENT — PAIN DESCRIPTION - DESCRIPTORS: DESCRIPTORS: ACHING;TIGHTNESS

## 2022-01-07 ASSESSMENT — PAIN DESCRIPTION - FREQUENCY: FREQUENCY: INTERMITTENT

## 2022-01-07 ASSESSMENT — PAIN DESCRIPTION - PAIN TYPE: TYPE: ACUTE PAIN

## 2022-01-07 NOTE — ED PROVIDER NOTES
[facility]  ED  Emergency Department Encounter  EmergencyMedicine Resident     Pt Name:Lena Fine  MRN: [id number]  Toshiagfzeke 1995  Date of evaluation: 1/7/22  PCP:  Collette Hoyt MD    This patient was evaluated in the Emergency Department for symptoms described in the history of present illness. The patient was evaluated in the context of the global COVID-19 pandemic, which necessitated consideration that the patient might be at risk for infection with the SARS-CoV-2 virus that causes COVID-19. Institutional protocols and algorithms that pertain to the evaluation of patients at risk for COVID-19 are in a state of rapid change based on information released by regulatory bodies including the CDC and federal and state organizations. These policies and algorithms were followed during the patient's care in the ED. CHIEF COMPLAINT       Chief Complaint   Patient presents with    Dizziness     started an hour ago after her nap, also c/o blurred vision and chest pain    Positive For Covid-19     tested positive on 12/31/21 per the patient       HISTORY OF PRESENT ILLNESS  (Location/Symptom, Timing/Onset, Context/Setting, Quality, Duration, Modifying Factors, Severity.)      Terence Trevino is a 23 y.o. female with history of anxiety and asthma who presents with 1 episode of acute onset dizziness. Patient states that earlier today she was taking a nap and when she awoke she was acutely dizzy. States that it felt like she was going to pass out and like the room was spinning. States that she got up to go take a shower which worsened her symptoms. States that her symptoms have not gone away. Patient is COVID-positive as of 1 1/1/2022 but appears to be mildly symptomatic. Endorses smoking marijuana yesterday. Patient also endorsing chest pain that is located on the left side of the chest and does not change with respiration.   Patient is also endorsing pain behind her right shoulder blade that is worse with inspiration. Patient also endorsing coughing up yellow sputum, denies brown-red sputum. Denies fever/chills, nausea/vomiting, shortness of breath, abdominal pain, change in bowel or bladder function, numbness or tingling, focal weakness, rash, vaginal discharge or bleeding    PAST MEDICAL / SURGICAL / SOCIAL / FAMILY HISTORY      has a past medical history of Asthma. has no past surgical history on file. Social History     Socioeconomic History    Marital status: Single     Spouse name: Not on file    Number of children: Not on file    Years of education: Not on file    Highest education level: Not on file   Occupational History    Not on file   Tobacco Use    Smoking status: Former Smoker    Smokeless tobacco: Never Used    Tobacco comment: Quit one year ago   Vaping Use    Vaping Use: Never used   Substance and Sexual Activity    Alcohol use: Yes     Comment: socially    Drug use: Yes     Types: Marijuana Oneil Budge)    Sexual activity: Yes     Partners: Female   Other Topics Concern    Not on file   Social History Narrative    Not on file     Social Determinants of Health     Financial Resource Strain:     Difficulty of Paying Living Expenses: Not on file   Food Insecurity:     Worried About [employer] in the Last Year: Not on file    Shahram of Food in the Last Year: Not on file   Transportation Needs:     Lack of Transportation (Medical): Not on file    Lack of Transportation (Non-Medical):  Not on file   Physical Activity:     Days of Exercise per Week: Not on file    Minutes of Exercise per Session: Not on file   Stress:     Feeling of Stress : Not on file   Social Connections:     Frequency of Communication with Friends and Family: Not on file    Frequency of Social Gatherings with Friends and Family: Not on file    Attends [employer] Services: Not on file    Active Member of Clubs or Organizations: Not on file    Attends Club or Organization Meetings: Not on file    Marital Status: Not on file   Intimate Partner Violence:     Fear of Current or Ex-Partner: Not on file    Emotionally Abused: Not on file    Physically Abused: Not on file    Sexually Abused: Not on file   Housing Stability:     Unable to Pay for Housing in the Last Year: Not on file    Number of [place] in the Last Year: Not on file    Unstable Housing in the Last Year: Not on file       No family history on file. Allergies:    Patient has no allergy information on record. Home Medications:  Prior to Admission medications    Not on File       REVIEW OF SYSTEMS    (2-9 systems for level 4, 10 or more for level 5)    Review of Systems   Constitutional: Negative for chills and fever. HENT: Negative for congestion. Eyes: Negative for visual disturbance. Respiratory: Negative for cough and shortness of breath. Cardiovascular: Positive for chest pain. Gastrointestinal: Negative for abdominal pain, constipation, diarrhea, nausea and vomiting. Genitourinary: Negative for difficulty urinating, dysuria, vaginal bleeding and vaginal discharge. Musculoskeletal: Negative for back pain. Skin: Negative for wound. Neurological: Positive for dizziness. Negative for weakness, numbness and headaches. PHYSICAL EXAM   (up to 7 for level 4, 8 or more for level 5)    INITIAL VITALS:   BP (!) 164/100   Pulse 134   Temp 99.3 °F (37.4 °C) (Oral)   Resp 20   Ht 5' 7\" (1.702 m)   Wt 180 lb (81.6 kg)   LMP  (LMP Unknown)   SpO2 96%   BMI 28.19 kg/m²   I have reviewed the triage vital signs. Const: Well nourished, well developed, appears stated age  Eyes: PERRL, no conjunctival injection  HENT: NCAT, Neck supple without meningismus   CV: RRR, Warm, well-perfused extremities  RESP: CTAB, Unlabored respiratory effort  GI: soft, non-tender, non-distended, no masses  MSK: No gross deformities appreciated  Skin: Warm, dry.  No rashes  Neuro: Alert, CNs II-XII grossly intact. Sensation and motor function of extremities grossly intact. Psych: Appropriate mood and affect. DIFFERENTIAL  DIAGNOSIS   DDX:  Anxiety, PE, pneumonia, pneumothorax, ACS, COVID, flu, viral syndrome    Initial MDM:  HPI: 72-year-old previously healthy female presents emergency department for acute onset dizziness as well as chest pain. Patient is COVID-positive. Vitals: Tachycardic to 134, hypertensive 164/100. Rest of labs are within normal limits. PE: See above  Plan: EKG, chest x-ray, troponin, BNP, D-dimer, CBC, BMP. Ibuprofen and meclizine.   Will reassess    PLAN (Gurwinder Haney / Marybeth Carver / EKG):  Orders Placed This Encounter   Procedures    XR CHEST PORTABLE    Troponin    Brain Natriuretic Peptide    D-DIMER, QUANTITATIVE    CBC WITH AUTO DIFFERENTIAL    BASIC METABOLIC PANEL       MEDICATIONS ORDERED:  Orders Placed This Encounter   Medications    ibuprofen (ADVIL;MOTRIN) tablet 600 mg    meclizine (ANTIVERT) tablet 12.5 mg         DIAGNOSTIC RESULTS / EMERGENCY DEPARTMENT COURSE / MDM   LAB RESULTS:  Results for orders placed or performed during the hospital encounter of 01/07/22   Troponin   Result Value Ref Range    Troponin, High Sensitivity <6 0 - 14 ng/L    Troponin T NOT REPORTED <0.03 ng/mL    Troponin Interp NOT REPORTED    Brain Natriuretic Peptide   Result Value Ref Range    Pro-BNP 24 <300 pg/mL    BNP Interpretation NOT REPORTED    D-DIMER, QUANTITATIVE   Result Value Ref Range    D-Dimer, Quant <0.17 mg/L FEU   CBC WITH AUTO DIFFERENTIAL   Result Value Ref Range    WBC 6.9 3.5 - 11.3 k/uL    RBC 4.34 3.95 - 5.11 m/uL    Hemoglobin 14.5 11.9 - 15.1 g/dL    Hematocrit 40.7 36.3 - 47.1 %    MCV 93.8 82.6 - 102.9 fL    MCH 33.4 25.2 - 33.5 pg    MCHC 35.6 (H) 28.4 - 34.8 g/dL    RDW 11.6 (L) 11.8 - 14.4 %    Platelets 956 169 - 225 k/uL    MPV 9.8 8.1 - 13.5 fL    NRBC Automated 0.0 0.0 per 100 WBC    Differential Type NOT REPORTED     WBC Morphology NOT REPORTED     RBC Morphology NOT REPORTED     Platelet Estimate NOT REPORTED     Immature Granulocytes 0 0 %    Seg Neutrophils 57 36 - 66 %    Lymphocytes 36 24 - 44 %    Monocytes 5 1 - 7 %    Eosinophils % 2 1 - 4 %    Basophils 0 0 - 2 %    nRBC 1 (H) 0 per 100 WBC    Absolute Immature Granulocyte 0.00 0.00 - 0.30 k/uL    Segs Absolute 3.93 1.8 - 7.7 k/uL    Absolute Lymph # 2.48 1.0 - 4.8 k/uL    Absolute Mono # 0.35 0.1 - 0.8 k/uL    Absolute Eos # 0.14 0.0 - 0.4 k/uL    Basophils Absolute 0.00 0.0 - 0.2 k/uL    Morphology Normal    BASIC METABOLIC PANEL   Result Value Ref Range    Glucose 118 (H) 70 - 99 mg/dL    BUN 11 6 - 20 mg/dL    CREATININE 0.70 0.50 - 0.90 mg/dL    Bun/Cre Ratio NOT REPORTED 9 - 20    Calcium 9.2 8.6 - 10.4 mg/dL    Sodium 136 135 - 144 mmol/L    Potassium 3.7 3.7 - 5.3 mmol/L    Chloride 102 98 - 107 mmol/L    CO2 20 20 - 31 mmol/L    Anion Gap 14 9 - 17 mmol/L    GFR Non-African American >60 >60 mL/min    GFR African American >60 >60 mL/min    GFR Comment          GFR Staging NOT REPORTED    EKG 12 Lead   Result Value Ref Range    Ventricular Rate 126 BPM    Atrial Rate 126 BPM    P-R Interval 132 ms    QRS Duration 80 ms    Q-T Interval 304 ms    QTc Calculation (Bazett) 440 ms    P Axis 54 degrees    R Axis 45 degrees    T Axis 37 degrees     Lab work is unremarkable      RADIOLOGY:  Awaiting chest x-ray    EKG  Rhythm: normal sinus   Rate: tachycardia  Axis: normal  Ectopy: none  Conduction: normal  ST Segments: no acute change  T Waves: no acute change  Q Waves: none    EKG  Impression: no acute changes, non-specific EKG and sinus tachycardia     All EKG's are interpreted by the Emergency Department Physician who either signs or Co-signs this chart in the absence of a cardiologist.      PROCEDURES:  None indicated      CONSULTS:  None      MDM/EMERGENCY DEPARTMENT COURSE:  73-year-old previously healthy female presents emergency department for acute onset dizziness as well as chest pain.   Patient is COVID-positive. Awaiting chest x-ray. Transfer of care to Dr. Ricci Guzman. CRITICAL CARE:  Please see Attending Note    FINAL IMPRESSION      1. Chest pain, unspecified type        DISPOSITION / PLAN     DISPOSITION      PATIENT REFERRED TO:  No follow-up provider specified.     DISCHARGE MEDICATIONS:  New Prescriptions    No medications on file       Betsy Suazo DO  Emergency Medicine Resident    (Please note that portions of this note were completed with a voice recognition program.  Efforts were made to edit the dictations but occasionally words are mis-transcribed.)        Betsy Suazo DO  Resident  01/08/22 [address] [place] [address], DO  Resident  01/08/22 [address] [place] [address], DO  Resident  01/11/22 2014

## 2022-01-07 NOTE — ED PROVIDER NOTES
[facility]  Emergency Department  Faculty Attestation     I performed a history and physical examination of the patient and discussed management with the resident. I reviewed the residents note and agree with the documented findings and plan of care. Any areas of disagreement are noted on the chart. I was personally present for the key portions of any procedures. I have documented in the chart those procedures where I was not present during the key portions. I have reviewed the emergency nurses triage note. I agree with the chief complaint, past medical history, past surgical history, allergies, medications, social and family history as documented unless otherwise noted below. For Physician Assistant/ Nurse Practitioner cases/documentation I have personally evaluated this patient and have completed at least one if not all key elements of the E/M (history, physical exam, and MDM). Additional findings are as noted. Primary Care Physician:  Harsh Jason MD    Screenings:  [unfilled]    CHIEF COMPLAINT       Chief Complaint   Patient presents with    Dizziness     started an hour ago after her nap, also c/o blurred vision and chest pain    Positive For Covid-19     tested positive on 12/31/21 per the patient       RECENT VITALS:   Temp: 99.3 °F (37.4 °C),  Pulse: 134, Resp: 20, BP: (!) 164/100    LABS:  Labs Reviewed - No data to display    Radiology  No orders to display       CRITICAL CARE: There was a high probability of clinically significant/life threatening deterioration in this patient's condition which required my urgent intervention. Total critical care time was none minutes. This excludes any time for separately reportable procedures.      EKG:   EKG Interpretation    Interpreted by me    Rhythm: normal sinus   Rate: Tachycardia  Axis: normal  Ectopy: none  Conduction: normal  ST Segments: no acute change  T Waves: no acute change  Q Waves: none    Clinical Impression: Tachycardia    Attending Physician Additional  Notes    Patient has known COVID for the past week. Has been over the past hour. While at rest she notes she had difficulty texting as far as using her fingers, vertigo with nausea, mild headache, significant nonradiating chest discomfort which was not worse with coughing or breathing, no associated hemoptysis. There is no radiation of the chest discomfort. No esophageal symptoms. No leg pain calf swelling immobility. There is mild headache but no stiff neck. No history of migraines. She does have history of anxiety. On exam she is initially tachycardic to 134, now at rest she is 104. She is hypertensive in no respiratory distress. She is afebrile. Saturations pending. GCS is 15. Normal pupils and extraocular movements. Gaze is conjugate. She has not fatiguing nystagmus with leftward gaze, fast left. No correction with test of skew. She does have saccades with head impulse maneuvers. Normal finger-nose movements. Normal cranial nerves and motor strength. Negative drift. Lungs are clear. Card exam is benign. No edema cords Homans or calf tenderness. Impression is peripheral vertigo, COVID, atypical chest pain, rule out pulmonary embolism pneumonia or hypoxemia, unlikely pericarditis or ischemic issue, unlikely central vertigo. Plan is chest x-ray, pulse oximetry stress test, analgesics, Zofran or meclizine, D-dimer, troponin, reassess. Arch Staff.  Rose David MD, [facility]  Attending Emergency  Physician               Georgie West MD  01/07/22 5803

## 2022-01-08 LAB
EKG ATRIAL RATE: 126 BPM
EKG P AXIS: 54 DEGREES
EKG P-R INTERVAL: 132 MS
EKG Q-T INTERVAL: 304 MS
EKG QRS DURATION: 80 MS
EKG QTC CALCULATION (BAZETT): 440 MS
EKG R AXIS: 45 DEGREES
EKG T AXIS: 37 DEGREES
EKG VENTRICULAR RATE: 126 BPM

## 2022-01-08 NOTE — ED PROVIDER NOTES
[facility] ED  Emergency Department  Emergency Medicine Resident Sign-out     Care of Salome Knox was assumed from Dr. Fernanda Henriquez and is being seen for Dizziness (started an hour ago after her nap, also c/o blurred vision and chest pain) and Positive For Covid-19 (tested positive on 12/31/21 per the patient)  . The patient's initial evaluation and plan have been discussed with the prior provider who initially evaluated the patient. EMERGENCY DEPARTMENT COURSE / MEDICAL DECISION MAKING:       MEDICATIONS GIVEN:  Orders Placed This Encounter   Medications    ibuprofen (ADVIL;MOTRIN) tablet 600 mg    meclizine (ANTIVERT) tablet 12.5 mg       LABS / RADIOLOGY:     Labs Reviewed   CBC WITH AUTO DIFFERENTIAL - Abnormal; Notable for the following components:       Result Value    MCHC 35.6 (*)     RDW 11.6 (*)     nRBC 1 (*)     All other components within normal limits   BASIC METABOLIC PANEL - Abnormal; Notable for the following components:    Glucose 118 (*)     All other components within normal limits   TROPONIN   BRAIN NATRIURETIC PEPTIDE   D-DIMER, QUANTITATIVE       XR CHEST PORTABLE    Result Date: 1/7/2022  EXAMINATION: ONE XRAY VIEW OF THE CHEST 1/7/2022 6:46 pm COMPARISON: Chest radiograph performed April 9, 2021. HISTORY: ORDERING SYSTEM PROVIDED HISTORY: chest pain TECHNOLOGIST PROVIDED HISTORY: chest pain FINDINGS: No focal consolidation, pleural effusion, or pneumothorax. Stable cardiomediastinal silhouette. No acute osseous abnormality. No acute cardiopulmonary findings. RECENT VITALS:     Temp: 99.3 °F (37.4 °C),  Pulse: 105, Resp: 20, BP: 132/84, SpO2: 97 %    This patient is a 23 y.o. Female with history of COVID diagnosed 1/1. Has shortness of breath dizziness felt presyncopal, went to lie down. Heart rate has been in the 130s on arrival, improved to 105. Troponin negative, laboratory work-up negative chest x-ray unremarkable follow-up with PCP.   Gave

## 2022-02-11 ENCOUNTER — HOSPITAL ENCOUNTER (EMERGENCY)
Age: 27
Discharge: HOME | End: 2022-02-11
Attending: EMERGENCY MEDICINE
Payer: COMMERCIAL

## 2022-02-11 VITALS
TEMPERATURE: 98.4 F | RESPIRATION RATE: 16 BRPM | SYSTOLIC BLOOD PRESSURE: 128 MMHG | OXYGEN SATURATION: 97 % | DIASTOLIC BLOOD PRESSURE: 99 MMHG | HEART RATE: 96 BPM

## 2022-02-11 DIAGNOSIS — Z77.29 CARBON MONOXIDE EXPOSURE: Primary | ICD-10-CM

## 2022-02-11 PROCEDURE — 99283 EMERGENCY DEPT VISIT LOW MDM: CPT

## 2022-02-11 PROCEDURE — 6370000000 HC RX 637 (ALT 250 FOR IP): Performed by: GENERAL PRACTICE

## 2022-02-11 RX ORDER — ONDANSETRON 4 MG/1
4 TABLET, ORALLY DISINTEGRATING ORAL EVERY 8 HOURS PRN
Qty: 10 TABLET | Refills: 0 | Status: SHIPPED | OUTPATIENT
Start: 2022-02-11

## 2022-02-11 RX ORDER — MECLIZINE HCL 12.5 MG/1
12.5 TABLET ORAL 3 TIMES DAILY PRN
Qty: 9 TABLET | Refills: 0 | Status: SHIPPED | OUTPATIENT
Start: 2022-02-11 | End: 2022-02-14

## 2022-02-11 RX ORDER — MECLIZINE HCL 12.5 MG/1
12.5 TABLET ORAL ONCE
Status: COMPLETED | OUTPATIENT
Start: 2022-02-11 | End: 2022-02-11

## 2022-02-11 RX ORDER — ONDANSETRON 4 MG/1
4 TABLET, FILM COATED ORAL ONCE
Status: COMPLETED | OUTPATIENT
Start: 2022-02-11 | End: 2022-02-11

## 2022-02-11 RX ADMIN — MECLIZINE HCL 12.5 MG 12.5 MG: 12.5 TABLET ORAL at 19:32

## 2022-02-11 RX ADMIN — ONDANSETRON HYDROCHLORIDE 4 MG: 4 TABLET, FILM COATED ORAL at 19:32

## 2022-02-11 ASSESSMENT — ENCOUNTER SYMPTOMS
NAUSEA: 1
BACK PAIN: 0
DIARRHEA: 0
VOMITING: 0
COUGH: 0
ABDOMINAL PAIN: 0
EYE PAIN: 0
EYE REDNESS: 0
SHORTNESS OF BREATH: 0

## 2022-02-12 NOTE — ED PROVIDER NOTES
[facility] ED  Emergency Department Encounter  EmergencyMedicine Resident     Pt Name:Lena Gleason  MRN: [id number]  Toshiagfzeke 1995  Date of evaluation: 2/11/22  PCP:  Clotilde Cardoso MD    [address] [place] [address]       Chief Complaint   Patient presents with    Dizziness     past hour, states her dryer hose disconnected and believes to have inhaled carbon monoxide, c/o foggy    Nausea       HISTORY OF PRESENT ILLNESS  (Location/Symptom, Timing/Onset, Context/Setting, Quality, Duration, Modifying Factors, Severity.)      Susi Varela is a 33 y.o. female who presents with multiple medical complaints to include dizziness, confusion, she had nausea, headache, vision changes. Patient states that she would go check her dryer as she has been doing laundry in the dryer hose had come unplugged and she is concerned that she has carbon oxide poisoning. Patient states she does have a monitor the house however she thinks it is broken. Patient states that she does feel nauseous now and is still has a mild headache. Not the worst taken of her life, was not sudden in onset. Patient states that the weakness has improved since arriving to the emergency department. PAST MEDICAL / SURGICAL / SOCIAL / FAMILY HISTORY      has a past medical history of Asthma. has no past surgical history on file.     Social History     Socioeconomic History    Marital status: Single     Spouse name: Not on file    Number of children: Not on file    Years of education: Not on file    Highest education level: Not on file   Occupational History    Not on file   Tobacco Use    Smoking status: Former Smoker    Smokeless tobacco: Never Used    Tobacco comment: Quit one year ago   Vaping Use    Vaping Use: Never used   Substance and Sexual Activity    Alcohol use: Yes     Comment: socially    Drug use: Yes     Types: Marijuana Don Safer)    Sexual activity: Yes     Partners: Female   Other Topics Concern    Not on file   Social History Narrative    Not on file     Social Determinants of Health     Financial Resource Strain:     Difficulty of Paying Living Expenses: Not on file   Food Insecurity:     Worried About Running Out of Food in the Last Year: Not on file    Shahram of Food in the Last Year: Not on file   Transportation Needs:     Lack of Transportation (Medical): Not on file    Lack of Transportation (Non-Medical): Not on file   Physical Activity:     Days of Exercise per Week: Not on file    Minutes of Exercise per Session: Not on file   Stress:     Feeling of Stress : Not on file   Social Connections:     Frequency of Communication with Friends and Family: Not on file    Frequency of Social Gatherings with Friends and Family: Not on file    Attends [employer] Services: Not on file    Active Member of [address] [place] [address] [employer] or Organizations: Not on file    Attends Club or Organization Meetings: Not on file    Marital Status: Not on file   Intimate Partner Violence:     Fear of Current or Ex-Partner: Not on file    Emotionally Abused: Not on file    Physically Abused: Not on file    Sexually Abused: Not on file   Housing Stability:     Unable to Pay for Housing in the Last Year: Not on file    Number of [place] in the Last Year: Not on file    Unstable Housing in the Last Year: Not on file       History reviewed. No pertinent family history. Allergies:  Patient has no known allergies. Home Medications:  Prior to Admission medications    Medication Sig Start Date End Date Taking?  Authorizing Provider   busPIRone HCl (BUSPAR PO) Take by mouth   Yes Historical Provider, MD   HYDROXYZINE HCL PO Take by mouth   Yes Historical Provider, MD   meclizine (ANTIVERT) 12.5 MG tablet Take 1 tablet by mouth 3 times daily as needed for Dizziness 2/11/22 2/14/22 Yes Jah De La Rosa DO   ondansetron (ZOFRAN ODT) 4 MG disintegrating tablet Take 1 tablet by mouth every 8 hours as needed for Nausea 2/11/22  Yes Lucia Economy, DO       REVIEW OF SYSTEMS    (2-9 systems for level 4, 10 or more for level 5)      Review of Systems   Constitutional: Negative for activity change. HENT: Negative for congestion. Eyes: Negative for pain, redness and visual disturbance. Respiratory: Negative for cough and shortness of breath. Cardiovascular: Negative for chest pain. Gastrointestinal: Positive for nausea. Negative for abdominal pain, diarrhea and vomiting. Genitourinary: Negative for dysuria and menstrual problem. Musculoskeletal: Negative for back pain and gait problem. Neurological: Positive for dizziness, weakness and headaches. Negative for light-headedness. Psychiatric/Behavioral: Negative for agitation, confusion and self-injury. The patient is nervous/anxious. PHYSICAL EXAM   (up to 7 for level 4, 8 or more for level 5)      INITIAL VITALS:   BP (!) 128/99   Pulse 96   Temp 98.4 °F (36.9 °C) (Oral)   Resp 16   SpO2 97%     Physical Exam  Vitals reviewed. Constitutional:       Comments: Patient sitting on stretcher, is on her cell phone, as no acute distress, does appear somewhat anxious, not toxic appearing or diaphoretic   HENT:      Head: Normocephalic and atraumatic. Mouth/Throat:      Mouth: Mucous membranes are moist.   Eyes:      Extraocular Movements: Extraocular movements intact. Pupils: Pupils are equal, round, and reactive to light. Cardiovascular:      Rate and Rhythm: Normal rate. Pulses: Normal pulses. Pulmonary:      Effort: Pulmonary effort is normal.      Breath sounds: Normal breath sounds. Abdominal:      General: Abdomen is flat. There is no distension. Palpations: Abdomen is soft. Tenderness: There is no abdominal tenderness. There is no guarding or rebound. Musculoskeletal:         General: No swelling or tenderness. Right lower leg: No edema. Left lower leg: No edema. Skin:     General: Skin is warm.       Findings: No erythema, lesion or rash. Neurological:      Mental Status: She is alert. Comments: Cranial nerves II through XII grossly intact   Psychiatric:      Comments: Patient is somewhat anxious         DIFFERENTIAL  DIAGNOSIS     PLAN (LABS / IMAGING / EKG):  No orders of the defined types were placed in this encounter. MEDICATIONS ORDERED:  Orders Placed This Encounter   Medications    meclizine (ANTIVERT) tablet 12.5 mg    ondansetron (ZOFRAN) tablet 4 mg    meclizine (ANTIVERT) 12.5 MG tablet     Sig: Take 1 tablet by mouth 3 times daily as needed for Dizziness     Dispense:  9 tablet     Refill:  0    ondansetron (ZOFRAN ODT) 4 MG disintegrating tablet     Sig: Take 1 tablet by mouth every 8 hours as needed for Nausea     Dispense:  10 tablet     Refill:  0       DDX: Carbon oxide exposure, anxiety, BPV    DIAGNOSTIC RESULTS / EMERGENCY DEPARTMENT COURSE / MDM   :  No results found for this visit on 02/11/22. RADIOLOGY:  None    EKG  None    All EKG's are interpreted by the Emergency Department Physician who either signs or Co-signs this chart in the absence of a cardiologist.    EMERGENCY DEPARTMENT COURSE/IMPRESSION: 64-year-old female present emergency department with concern for carbon oxide exposure. Upon arrival emergency department patient states that she does have some mild dizziness and some mild nausea but other symptoms of completely resolved. Patient states she wanted to be checked out to make sure that she did not have carbon oxide poisoning. Carbon monoxide level taken with cooximeter which was 6. Patient was given Antivert and Zofran for dizziness and nausea. Neuro exam is unremarkable. Patient observed for nearly 2 hours, symptoms greatly improved patient is back to baseline.   Educated patient on follow-up with primary care provider, ensuring that all of her appliances are working patient states she does have a carbon oxide detector at home, strict ED return precautions were discussed. PROCEDURES:  None    CONSULTS:  None    CRITICAL CARE:  None    FINAL IMPRESSION      1.  Carbon monoxide exposure          DISPOSITION / PLAN     DISPOSITION Decision To Discharge 02/11/2022 08:08:44 PM      PATIENT REFERRED TO:  [facility]  [address] [place] [address]  [phone]    As needed, If symptoms worsen    Demarco Cornelius MD  [address]            DISCHARGE MEDICATIONS:  New Prescriptions    MECLIZINE (ANTIVERT) 12.5 MG TABLET    Take 1 tablet by mouth 3 times daily as needed for Dizziness    ONDANSETRON (ZOFRAN ODT) 4 MG DISINTEGRATING TABLET    Take 1 tablet by mouth every 8 hours as needed for Nausea       Sudarshan Juarez DO  Emergency Medicine Resident    (Please note that portions of thisnote were completed with a voice recognition program.  Efforts were made to edit the dictations but occasionally words are mis-transcribed.)     Sudarshan Juarez DO  Resident  02/11/22 2014

## 2022-02-12 NOTE — ED PROVIDER NOTES
[facility] Department     Faculty Attestation    I performed a history and physical examination of the patient and discussed management with the resident. I reviewed the residents note and agree with the documented findings and plan of care. Any areas of disagreement are noted on the chart. I was personally present for the key portions of any procedures. I have documented in the chart those procedures where I was not present during the key portions. I have reviewed the emergency nurses triage note. I agree with the chief complaint, past medical history, past surgical history, allergies, medications, social and family history as documented unless otherwise noted below. For Physician Assistant/ Nurse Practitioner cases/documentation I have personally evaluated this patient and have completed at least one if not all key elements of the E/M (history, physical exam, and MDM). Additional findings are as noted. I have personally seen and evaluated the patient. I find the patient's history and physical exam are consistent with the NP/PA documentation. I agree with the care provided, treatment rendered, disposition and follow-up plan. This patient was evaluated in the Emergency Department for symptoms described in the history of present illness. The patient was evaluated in the context of the global COVID-19 pandemic, which necessitated consideration that the patient might be at risk for infection with the SARS-CoV-2 virus that causes COVID-19. Institutional protocols and algorithms that pertain to the evaluation of patients at risk for COVID-19 are in a state of rapid change based on information released by regulatory bodies including the CDC and federal and state organizations. These policies and algorithms were followed during the patient's care in the ED. 50-year-old female presenting with concern for carbon monoxide exposure.   Patient started having headache and dizziness after putting close in the gas , and realized that the gas hose was unhooked. No similar episodes in the past.    Exam:  General: Laying on the bed, awake, alert and in no acute distress  CV: normal rate and regular rhythm  Lungs: Breathing comfortably on room air with no tachypnea, hypoxia, or increased work of breathing    Plan:  Co-oximetry shows CO level of 6-7. No concern for carbon monoxide toxicity. Patient treated symptomatically with Zofran and meclizine.         Brenton Ochoa MD   Attending Emergency  Physician    (Please note that portions of this note were completed with a voice recognition program. Efforts were made to edit the dictations but occasionally words are mis-transcribed.)              Brenton Ochoa MD  02/11/22 0521

## 2022-02-12 NOTE — ED NOTES
Was in the shower at [phone] and she felt lightheaded and weak after she put laundry in the dryer and went to check and the gas hose was unhooked became nauseous, headache and very weak/dizzy all over     Lovely Palacios RN  02/11/22 5274

## 2022-03-10 ENCOUNTER — HOSPITAL ENCOUNTER (OUTPATIENT)
Age: 27
Setting detail: SPECIMEN
Discharge: HOME | End: 2022-03-10

## 2022-03-11 LAB
C TRACH DNA GENITAL QL NAA+PROBE: NEGATIVE
CANDIDA SPECIES, DNA PROBE: NEGATIVE
GARDNERELLA VAGINALIS, DNA PROBE: POSITIVE
N. GONORRHOEAE DNA: NEGATIVE
SOURCE: ABNORMAL
SPECIMEN DESCRIPTION: NORMAL
TRICHOMONAS VAGINALIS DNA: NEGATIVE

## 2022-03-18 LAB — CYTOLOGY REPORT: NORMAL

## 2022-05-17 ENCOUNTER — HOSPITAL ENCOUNTER (OUTPATIENT)
Age: 27
Setting detail: SPECIMEN
Discharge: HOME | End: 2022-05-17

## 2022-05-20 LAB
CULTURE: NORMAL
SPECIMEN DESCRIPTION: NORMAL

## 2022-09-12 ENCOUNTER — HOSPITAL ENCOUNTER (OUTPATIENT)
Age: 27
Setting detail: SPECIMEN
Discharge: HOME | End: 2022-09-12

## 2022-09-13 LAB
CANDIDA SPECIES, DNA PROBE: NEGATIVE
GARDNERELLA VAGINALIS, DNA PROBE: NEGATIVE
SOURCE: NORMAL
TRICHOMONAS VAGINALIS DNA: NEGATIVE

## 2022-09-14 LAB
C TRACH DNA GENITAL QL NAA+PROBE: NEGATIVE
N. GONORRHOEAE DNA: NEGATIVE
SOURCE: ABNORMAL
SPECIMEN DESCRIPTION: NORMAL
TRICHOMONAS VAGINALI, MOLECULAR: POSITIVE

## 2022-12-26 ENCOUNTER — APPOINTMENT (OUTPATIENT)
Dept: GENERAL RADIOLOGY | Age: 27
End: 2022-12-26

## 2022-12-26 ENCOUNTER — HOSPITAL ENCOUNTER (EMERGENCY)
Age: 27
Discharge: HOME | End: 2022-12-26
Attending: EMERGENCY MEDICINE

## 2022-12-26 VITALS
BODY MASS INDEX: 29.03 KG/M2 | DIASTOLIC BLOOD PRESSURE: 81 MMHG | OXYGEN SATURATION: 94 % | HEART RATE: 94 BPM | WEIGHT: 185 LBS | RESPIRATION RATE: 19 BRPM | HEIGHT: 67 IN | TEMPERATURE: 98.9 F | SYSTOLIC BLOOD PRESSURE: 126 MMHG

## 2022-12-26 DIAGNOSIS — J45.31 ACUTE EXACERBATION OF MILD PERSISTENT EXTRINSIC ASTHMA: Primary | ICD-10-CM

## 2022-12-26 LAB — C-REACTIVE PROTEIN: <3 MG/L (ref 0–5)

## 2022-12-26 PROCEDURE — 71045 X-RAY EXAM CHEST 1 VIEW: CPT

## 2022-12-26 PROCEDURE — 94640 AIRWAY INHALATION TREATMENT: CPT

## 2022-12-26 PROCEDURE — 99285 EMERGENCY DEPT VISIT HI MDM: CPT

## 2022-12-26 PROCEDURE — 93005 ELECTROCARDIOGRAM TRACING: CPT | Performed by: EMERGENCY MEDICINE

## 2022-12-26 PROCEDURE — 6370000000 HC RX 637 (ALT 250 FOR IP)

## 2022-12-26 PROCEDURE — 96375 TX/PRO/DX INJ NEW DRUG ADDON: CPT

## 2022-12-26 PROCEDURE — 86140 C-REACTIVE PROTEIN: CPT

## 2022-12-26 PROCEDURE — 96365 THER/PROPH/DIAG IV INF INIT: CPT

## 2022-12-26 PROCEDURE — 6360000002 HC RX W HCPCS

## 2022-12-26 RX ORDER — FLUTICASONE PROPIONATE AND SALMETEROL 100; 50 UG/1; UG/1
1 POWDER RESPIRATORY (INHALATION) EVERY 12 HOURS
Qty: 2 EACH | Refills: 2 | Status: SHIPPED | OUTPATIENT
Start: 2022-12-26 | End: 2023-03-26

## 2022-12-26 RX ORDER — GUAIFENESIN/DEXTROMETHORPHAN 100-10MG/5
10 SYRUP ORAL 3 TIMES DAILY PRN
Qty: 120 ML | Refills: 0 | Status: SHIPPED | OUTPATIENT
Start: 2022-12-26 | End: 2023-01-09

## 2022-12-26 RX ORDER — MONTELUKAST SODIUM 10 MG/1
10 TABLET ORAL NIGHTLY
Qty: 90 TABLET | Refills: 1 | Status: SHIPPED | OUTPATIENT
Start: 2022-12-26

## 2022-12-26 RX ORDER — IPRATROPIUM BROMIDE AND ALBUTEROL SULFATE 2.5; .5 MG/3ML; MG/3ML
1 SOLUTION RESPIRATORY (INHALATION)
Status: DISCONTINUED | OUTPATIENT
Start: 2022-12-26 | End: 2022-12-26 | Stop reason: HOSPADM

## 2022-12-26 RX ORDER — BUDESONIDE AND FORMOTEROL FUMARATE DIHYDRATE 160; 4.5 UG/1; UG/1
2 AEROSOL RESPIRATORY (INHALATION) 2 TIMES DAILY
Status: DISCONTINUED | OUTPATIENT
Start: 2022-12-26 | End: 2022-12-26 | Stop reason: HOSPADM

## 2022-12-26 RX ORDER — MAGNESIUM SULFATE IN WATER 40 MG/ML
2000 INJECTION, SOLUTION INTRAVENOUS ONCE
Status: COMPLETED | OUTPATIENT
Start: 2022-12-26 | End: 2022-12-26

## 2022-12-26 RX ORDER — METHYLPREDNISOLONE SODIUM SUCCINATE 125 MG/2ML
125 INJECTION, POWDER, LYOPHILIZED, FOR SOLUTION INTRAMUSCULAR; INTRAVENOUS ONCE
Status: COMPLETED | OUTPATIENT
Start: 2022-12-26 | End: 2022-12-26

## 2022-12-26 RX ORDER — FLUTICASONE PROPIONATE AND SALMETEROL 100; 50 UG/1; UG/1
1 POWDER RESPIRATORY (INHALATION) EVERY 12 HOURS
Qty: 1 EACH | Refills: 1 | Status: SHIPPED | OUTPATIENT
Start: 2022-12-26 | End: 2022-12-26 | Stop reason: SDUPTHER

## 2022-12-26 RX ORDER — PREDNISONE 50 MG/1
50 TABLET ORAL DAILY
Qty: 5 TABLET | Refills: 0 | Status: SHIPPED | OUTPATIENT
Start: 2022-12-26 | End: 2022-12-31

## 2022-12-26 RX ADMIN — METHYLPREDNISOLONE SODIUM SUCCINATE 125 MG: 125 INJECTION, POWDER, FOR SOLUTION INTRAMUSCULAR; INTRAVENOUS at 19:12

## 2022-12-26 RX ADMIN — MAGNESIUM SULFATE HEPTAHYDRATE 2000 MG: 40 INJECTION, SOLUTION INTRAVENOUS at 19:31

## 2022-12-26 RX ADMIN — BUDESONIDE AND FORMOTEROL FUMARATE DIHYDRATE 2 PUFF: 160; 4.5 AEROSOL RESPIRATORY (INHALATION) at 19:33

## 2022-12-26 RX ADMIN — IPRATROPIUM BROMIDE AND ALBUTEROL SULFATE 1 AMPULE: .5; 3 SOLUTION RESPIRATORY (INHALATION) at 19:33

## 2022-12-26 ASSESSMENT — PAIN - FUNCTIONAL ASSESSMENT
PAIN_FUNCTIONAL_ASSESSMENT: 0-10
PAIN_FUNCTIONAL_ASSESSMENT: ACTIVITIES ARE NOT PREVENTED

## 2022-12-26 ASSESSMENT — ENCOUNTER SYMPTOMS
WHEEZING: 1
CHEST TIGHTNESS: 1
BACK PAIN: 0
COLOR CHANGE: 0
CONSTIPATION: 0
SHORTNESS OF BREATH: 1
DIARRHEA: 0
EYE DISCHARGE: 0
ABDOMINAL DISTENTION: 0
COUGH: 1
CHOKING: 0
ABDOMINAL PAIN: 0
EYE ITCHING: 0
APNEA: 0

## 2022-12-26 ASSESSMENT — PAIN SCALES - GENERAL: PAINLEVEL_OUTOF10: 4

## 2022-12-26 ASSESSMENT — PAIN DESCRIPTION - DESCRIPTORS: DESCRIPTORS: SQUEEZING

## 2022-12-26 ASSESSMENT — PAIN DESCRIPTION - FREQUENCY: FREQUENCY: INTERMITTENT

## 2022-12-26 ASSESSMENT — PAIN DESCRIPTION - PAIN TYPE: TYPE: ACUTE PAIN

## 2022-12-26 ASSESSMENT — PAIN DESCRIPTION - LOCATION: LOCATION: CHEST

## 2022-12-26 NOTE — Clinical Note
Tin Russell was seen and treated in our emergency department on 12/26/2022. She may return to work on 11/30/2022. Patient has to follow COVID-19 isolation protocol until 11/30/2022     If you have any questions or concerns, please don't hesitate to call.       Josué Curry MD

## 2022-12-26 NOTE — ED TRIAGE NOTES
Was diagnosis with Covid this past Tuesday,had fevers, greenish yellow productive cough, nausea,    Pt was diagnosis at PCP, she went there thinking she had a sinus infection  Last fever was day she was diagnoses with Covid, was taking Tylenol for the fever     Pt here today, because the past 3 days she been c/o increased shortness of breath, wheezing, and tightness on her throat. and some chest pain that is squeezing and radiates into her back

## 2022-12-26 NOTE — ED PROVIDER NOTES
[facility]     Emergency Department     Faculty Note/ Attestation      Pt Name: Kashmir Orta                                       MRN: [id number]  Toshiagfzeke 1995  Date of evaluation: 12/26/2022    Patients PCP:    No primary care provider on file. Attestation  I performed a history and physical examination of the patient and discussed management with the resident. I reviewed the residents note and agree with the documented findings and plan of care. Any areas of disagreement are noted on the chart. I was personally present for the key portions of any procedures. I have documented in the chart those procedures where I was not present during the key portions. I have reviewed the emergency nurses triage note. I agree with the chief complaint, past medical history, past surgical history, allergies, medications, social and family history as documented unless otherwise noted below. For Physician Assistant/ Nurse Practitioner cases/documentation I have personally evaluated this patient and have completed at least one if not all key elements of the E/M (history, physical exam, and MDM). Additional findings are as noted.       Initial Screens:        [place] Coma Scale  Eye Opening: Spontaneous  Best Verbal Response: Oriented  Best Motor Response: Obeys commands  Caitlyn Coma Scale Score: 15    Vitals:    Vitals:    12/26/22 1842   Pulse: (!) 107   Resp: 20   Temp: 98.9 °F (37.2 °C)   TempSrc: Oral   SpO2: 95%   Weight: 185 lb (83.9 kg)   Height: 5' 7\" (1.702 m)       CHIEF COMPLAINT       Chief Complaint   Patient presents with    Shortness of Breath    Wheezing    Cough     Was diagnosis with Covid this past Tuesday,had fevers, greenish yellow productive cough, nausea,               DIAGNOSTIC RESULTS             RADIOLOGY:   XR CHEST PORTABLE    (Results Pending)         LABS:  Labs Reviewed   C-REACTIVE PROTEIN         EMERGENCY DEPARTMENT COURSE: -------------------------   , Temp: 98.9 °F (37.2 °C), Heart Rate: (!) 107, Resp: 20      Comments    Hx of asthma on albuterol  SOB and whz x5 days  COVID dx 6 days ago  Had fever at home  Tachy, Sat 95% in ED  Productive cough    Patient speaking in full sentences however does have significant wheezes. She states she is having shortness of breath when getting up to go the bathroom or go to the kitchen however is otherwise able to tolerate p.o. and getting fluids. After discussion with pharmacy we will have good outpatient alternatives, she is abdominal for packs locally. We will give her aggressive breathing treatments here, mag, steroids, get an x-ray.   If patient is able to improve and we are able to discharge her we will have her follow-up with PCP otherwise may need to place the hospital    (Please note that portions of this note were completed with a voice recognition program.  Efforts were made to edit the dictations but occasionally words are mis-transcribed.)      Ava Randhawa MD,, MD  Attending Emergency Physician          Ava Randhawa MD  12/26/22 Adam Mathias

## 2022-12-26 NOTE — Clinical Note
Gee Alejandro was seen and treated in our emergency department on 12/26/2022. She may return to work on 11/30/2022. Patient has to follow COVID-19 isolation protocol until 12/30/2022     If you have any questions or concerns, please don't hesitate to call.       Alyssa Chopra MD

## 2022-12-27 NOTE — ED NOTES
The following labs were labeled with appropriate pt sticker and tubed to lab:     [x] Blue     [x] Lavender   [] on ice  [x] Green/yellow  [] Green/black [] on ice  [] Berneda Sophia  [] on ice  [] Yellow  [x] Red  [] Type/ Screen  [] ABG  [] VBG    [] COVID-19 swab    [] Rapid  [] PCR  [] Flu swab  [] Peds Viral Panel     [] Urine Sample  [] Fecal Sample  [] Pelvic Cultures  [] Blood Cultures  [] X 2  [] STREP Cultures         Carrol Marcus RN  12/26/22 7553

## 2022-12-27 NOTE — ED PROVIDER NOTES
[facility] ED  Emergency Department Encounter  Emergency Medicine Resident     Pt Darvin Gruber  MRN: [id number]  [place] 1995  Date of evaluation: 12/26/22  PCP:  Errol Ahn MD      CHIEF COMPLAINT       Chief Complaint   Patient presents with    Shortness of Breath    Wheezing    Cough     Was diagnosis with Covid this past Tuesday,had fevers, greenish yellow productive cough, nausea,         HISTORY OF PRESENT ILLNESS  (Location/Symptom, Timing/Onset, Context/Setting, Quality, Duration, Modifying Factors, Severity.)      Sarah Alas is a 33 y.o. female who presents with past medical history of asthma, essential hypertension, generalized anxiety disorder, presented to the ED with 6-day history of shortness of breath, cough, fever. Patient was diagnosed with COVID-19 by rapid test on the PCPs office on Tuesday. Since then she developed high-grade fever ranging to 102 degree currently on remission. Was associated with cough, which was productive in nature, associated yellowish-greenish phlegm with it, patient complains she is having this shortness of breath for last 4 days which is progressively getting worse and worse. She took 10 puffs of albuterol inhaler this morning but could not keep up with the shortness of breath and that is why she came to the ED. On my evaluation today, patient is ANO x4. Hemodynamically stable. Currently on room air with a stable saturation of 93% without any oxygen support. PAST MEDICAL / SURGICAL / SOCIAL / FAMILY HISTORY      has a past medical history of Anxiety, Asthma, and IBS (irritable bowel syndrome). has a past surgical history that includes [place] tooth extraction.       Social History     Socioeconomic History    Marital status: Unknown     Spouse name: Not on file    Number of children: Not on file    Years of education: Not on file    Highest education level: Not on file   Occupational History    Not on file Tobacco Use    Smoking status: Never    Smokeless tobacco: Never   Substance and Sexual Activity    Alcohol use: Yes     Comment: social     Drug use: Yes     Types: Marijuana Sable Mingle)    Sexual activity: Not on file   Other Topics Concern    Not on file   Social History Narrative    Not on file     Social Determinants of Health     Financial Resource Strain: Not on file   Food Insecurity: Not on file   Transportation Needs: Not on file   Physical Activity: Not on file   Stress: Not on file   Social Connections: Not on file   Intimate Partner Violence: Not on file   Housing Stability: Not on file       History reviewed. No pertinent family history. Allergies:  Patient has no known allergies. Home Medications:  Prior to Admission medications    Medication Sig Start Date End Date Taking? Authorizing Provider   predniSONE (DELTASONE) 50 MG tablet Take 1 tablet by mouth daily for 5 days 12/26/22 12/31/22 Yes Becca Yañez MD   fluticasone-salmeterol (ADVAIR) 100-50 MCG/ACT AEPB diskus inhaler Inhale 1 puff into the lungs in the morning and 1 puff in the evening. 12/26/22  Yes Becca Yañez MD   guaiFENesin-dextromethorphan [facility] DM) 100-10 MG/5ML syrup Take 10 mLs by mouth 3 times daily as needed for Cough 12/26/22 1/9/23 Yes Becca Yañez MD   montelukast (SINGULAIR) 10 MG tablet Take 1 tablet by mouth nightly 12/26/22  Yes Becca Yañez MD   busPIRone (BUSPAR) 10 MG tablet Take 1 tablet by mouth 2 times daily 7/18/21   Lynsey Friend MD   traZODone (DESYREL) 50 MG tablet Take 1 tablet by mouth nightly as needed for Sleep 7/18/21   Lynsey Friend MD   albuterol sulfate HFA (VENTOLIN HFA) 108 (90 Base) MCG/ACT inhaler Inhale 2 puffs into the lungs every 6 hours as needed for Wheezing    Historical Provider, MD       REVIEW OF SYSTEMS    (2-9 systems for level 4, 10 or more for level 5)      Review of Systems   Constitutional:  Negative for activity change.    HENT: Negative for congestion, dental problem and ear discharge. Eyes:  Negative for discharge and itching. Respiratory:  Positive for cough, chest tightness, shortness of breath and wheezing. Negative for apnea and choking. Cardiovascular:  Negative for chest pain and leg swelling. Gastrointestinal:  Negative for abdominal distention, abdominal pain, constipation and diarrhea. Endocrine: Negative for cold intolerance and heat intolerance. Genitourinary:  Negative for difficulty urinating, dyspareunia, dysuria and enuresis. Musculoskeletal:  Negative for arthralgias and back pain. Skin:  Negative for color change and pallor. Neurological:  Negative for dizziness, facial asymmetry and headaches. Psychiatric/Behavioral:  Negative for agitation, behavioral problems and confusion. PHYSICAL EXAM   (up to 7 for level 4, 8 or more for level 5)      INITIAL VITALS:   /81   Pulse 94   Temp 98.9 °F (37.2 °C) (Oral)   Resp 19   Ht 5' 7\" (1.702 m)   Wt 185 lb (83.9 kg)   SpO2 94%   BMI 28.98 kg/m²     Physical Exam  Constitutional:       Appearance: She is well-developed. Cardiovascular:      Rate and Rhythm: Regular rhythm. Tachycardia present. No extrasystoles are present. Pulmonary:      Effort: Tachypnea, accessory muscle usage and respiratory distress present. Breath sounds: Examination of the right-upper field reveals wheezing. Examination of the left-upper field reveals wheezing. Examination of the right-middle field reveals wheezing. Examination of the left-middle field reveals wheezing. Examination of the right-lower field reveals wheezing. Examination of the left-lower field reveals wheezing. Wheezing present. Chest:      Chest wall: No mass, deformity or tenderness. Abdominal:      Palpations: There is no hepatomegaly or splenomegaly. Musculoskeletal:      Cervical back: Normal range of motion and neck supple. Neurological:      Mental Status: She is alert. DIFFERENTIAL  DIAGNOSIS     PLAN (LABS / IMAGING / EKG):  Orders Placed This Encounter   Procedures    XR CHEST PORTABLE    C-Reactive Protein       MEDICATIONS ORDERED:  Orders Placed This Encounter   Medications    methylPREDNISolone sodium (SOLU-MEDROL) injection 125 mg    ipratropium-albuterol (DUONEB) nebulizer solution 1 ampule     Order Specific Question:   Initiate RT Bronchodilator Protocol     Answer:   Yes - ED protocol    budesonide-formoterol (SYMBICORT) 160-4.5 MCG/ACT inhaler 2 puff    magnesium sulfate 2000 mg in 50 mL IVPB premix    predniSONE (DELTASONE) 50 MG tablet     Sig: Take 1 tablet by mouth daily for 5 days     Dispense:  5 tablet     Refill:  0    fluticasone-salmeterol (ADVAIR) 100-50 MCG/ACT AEPB diskus inhaler     Sig: Inhale 1 puff into the lungs in the morning and 1 puff in the evening. Dispense:  1 each     Refill:  1    guaiFENesin-dextromethorphan (ROBITUSSIN DM) 100-10 MG/5ML syrup     Sig: Take 10 mLs by mouth 3 times daily as needed for Cough     Dispense:  120 mL     Refill:  0    montelukast (SINGULAIR) 10 MG tablet     Sig: Take 1 tablet by mouth nightly     Dispense:  90 tablet     Refill:  1       DDX: Acute Asthma Exacarbation due to Covid 19    DIAGNOSTIC RESULTS / EMERGENCY DEPARTMENT COURSE / MDM   LAB RESULTS:  Results for orders placed or performed during the hospital encounter of 12/26/22   C-Reactive Protein   Result Value Ref Range    CRP <3.0 0.0 - 5.0 mg/L       IMPRESSION: Acute Asthma Exacarbation due to Cpvod 19    RADIOLOGY:  XR CHEST PORTABLE   Final Result   No acute process. EMERGENCY DEPARTMENT COURSE:  Patient came into the ED with complaints of 6 days of shortness of breath, 5 days of cough, history of fever with the highest febrile episode recording of 102 °F.  Patient was diagnosed with COVID-19 in the outpatient primary care clinic on Tuesday. We ordered chest x-ray, CRP.   Patient was given 125 mg IV methylprednisolone, magnesium sulfate, DuoNeb, Symbicort inhaler. Chest x-ray was negative for any evidence of COVID-19 pneumonia. CRP was negative. No notes of  Admission Criteria type on file. FINAL IMPRESSION    Acute asthma exacerbation due to COVID-19    DISPOSITION / PLAN     DISPOSITION Decision To Discharge 12/26/2022 09:16:40 PM      PATIENT REFERRED TO:  Mayo Lopez MD  [facility], [facility] [address] [phone]    Schedule an appointment as soon as possible for a visit in 1 day  Follow up within 1 days, Return to ED sooner if symptoms worsen    DISCHARGE MEDICATIONS:  New Prescriptions    FLUTICASONE-SALMETEROL (ADVAIR) 100-50 MCG/ACT AEPB DISKUS INHALER    Inhale 1 puff into the lungs in the morning and 1 puff in the evening.     GUAIFENESIN-DEXTROMETHORPHAN (ROBITUSSIN DM) 100-10 MG/5ML SYRUP    Take 10 mLs by mouth 3 times daily as needed for Cough    MONTELUKAST (SINGULAIR) 10 MG TABLET    Take 1 tablet by mouth nightly    PREDNISONE (DELTASONE) 50 MG TABLET    Take 1 tablet by mouth daily for 5 days       Manjula Colby MD  Emergency Medicine Resident    (Please note that portions of thisnote were completed with a voice recognition program.  Efforts were made to edit the dictations but occasionally words are mis-transcribed.)      Manjula Colby MD  Resident  12/26/22 [id number]

## 2022-12-27 NOTE — DISCHARGE INSTRUCTIONS
1.You were here for acute exacerbation of asthma likely due to underlying COVID-19 infection  2. Please take the medication as prescribed  3. Please call your primary care doctor tomorrow to schedule an outpatient visit as soon as possible  4. If your symptoms worsens or you feel any discomfort regarding your illness please return to the ED ASAP.

## 2022-12-28 LAB
EKG ATRIAL RATE: 108 BPM
EKG P AXIS: 65 DEGREES
EKG P-R INTERVAL: 128 MS
EKG Q-T INTERVAL: 322 MS
EKG QRS DURATION: 76 MS
EKG QTC CALCULATION (BAZETT): 431 MS
EKG R AXIS: 51 DEGREES
EKG T AXIS: 43 DEGREES
EKG VENTRICULAR RATE: 108 BPM

## 2022-12-29 LAB
EKG ATRIAL RATE: 103 BPM
EKG P AXIS: 52 DEGREES
EKG P-R INTERVAL: 126 MS
EKG Q-T INTERVAL: 336 MS
EKG QRS DURATION: 74 MS
EKG QTC CALCULATION (BAZETT): 440 MS
EKG R AXIS: 34 DEGREES
EKG T AXIS: 37 DEGREES
EKG VENTRICULAR RATE: 103 BPM

## 2023-05-27 ENCOUNTER — HOSPITAL ENCOUNTER (EMERGENCY)
Age: 28
Discharge: HOME | End: 2023-05-27
Attending: EMERGENCY MEDICINE

## 2023-05-27 ENCOUNTER — APPOINTMENT (OUTPATIENT)
Dept: GENERAL RADIOLOGY | Age: 28
End: 2023-05-27

## 2023-05-27 VITALS
DIASTOLIC BLOOD PRESSURE: 96 MMHG | HEART RATE: 85 BPM | RESPIRATION RATE: 20 BRPM | TEMPERATURE: 97 F | OXYGEN SATURATION: 95 % | SYSTOLIC BLOOD PRESSURE: 135 MMHG

## 2023-05-27 DIAGNOSIS — J45.41 MODERATE PERSISTENT ASTHMA WITH EXACERBATION: Primary | ICD-10-CM

## 2023-05-27 LAB
SARS-COV-2 RDRP RESP QL NAA+PROBE: NOT DETECTED
SPECIMEN DESCRIPTION: NORMAL

## 2023-05-27 PROCEDURE — 99284 EMERGENCY DEPT VISIT MOD MDM: CPT

## 2023-05-27 PROCEDURE — 94664 DEMO&/EVAL PT USE INHALER: CPT

## 2023-05-27 PROCEDURE — 87635 SARS-COV-2 COVID-19 AMP PRB: CPT

## 2023-05-27 PROCEDURE — 6370000000 HC RX 637 (ALT 250 FOR IP): Performed by: EMERGENCY MEDICINE

## 2023-05-27 PROCEDURE — 93005 ELECTROCARDIOGRAM TRACING: CPT | Performed by: EMERGENCY MEDICINE

## 2023-05-27 PROCEDURE — 94761 N-INVAS EAR/PLS OXIMETRY MLT: CPT

## 2023-05-27 PROCEDURE — 94640 AIRWAY INHALATION TREATMENT: CPT

## 2023-05-27 PROCEDURE — 71045 X-RAY EXAM CHEST 1 VIEW: CPT

## 2023-05-27 RX ORDER — PREDNISONE 50 MG/1
50 TABLET ORAL DAILY
Qty: 4 TABLET | Refills: 0 | Status: SHIPPED | OUTPATIENT
Start: 2023-05-28 | End: 2023-06-01

## 2023-05-27 RX ORDER — PREDNISONE 20 MG/1
60 TABLET ORAL ONCE
Status: COMPLETED | OUTPATIENT
Start: 2023-05-27 | End: 2023-05-27

## 2023-05-27 RX ORDER — IPRATROPIUM BROMIDE AND ALBUTEROL SULFATE 2.5; .5 MG/3ML; MG/3ML
1 SOLUTION RESPIRATORY (INHALATION) EVERY 4 HOURS PRN
Status: DISCONTINUED | OUTPATIENT
Start: 2023-05-27 | End: 2023-05-27 | Stop reason: HOSPADM

## 2023-05-27 RX ADMIN — IPRATROPIUM BROMIDE AND ALBUTEROL SULFATE 1 DOSE: .5; 2.5 SOLUTION RESPIRATORY (INHALATION) at 16:22

## 2023-05-27 RX ADMIN — IPRATROPIUM BROMIDE AND ALBUTEROL SULFATE 1 DOSE: .5; 2.5 SOLUTION RESPIRATORY (INHALATION) at 16:44

## 2023-05-27 RX ADMIN — PREDNISONE 60 MG: 20 TABLET ORAL at 15:40

## 2023-05-27 ASSESSMENT — ENCOUNTER SYMPTOMS
VOMITING: 0
SHORTNESS OF BREATH: 1
COUGH: 1
EYE DISCHARGE: 1
ABDOMINAL PAIN: 0
EYE REDNESS: 1

## 2023-05-27 ASSESSMENT — PAIN SCALES - GENERAL: PAINLEVEL_OUTOF10: 2

## 2023-05-27 NOTE — DISCHARGE INSTRUCTIONS
You were seen today for asthma exacerbation. You likely need to separate from the cat. Please take steroids as directed and continue to use inhalers as directed. If you notice any concerning symptoms please return to the ER immediately. These can include but are not limited to: fevers, chills, shortness of breath, vomiting, weakness of the extremities, changes in your mental status, numbness, pale extremities, or chest pain. Medications: Continue taking your home medications as previously directed. Please take steroids as directed. Follow up: Please follow up with your primary care doctor within one week or as needed.

## 2023-05-27 NOTE — ED PROVIDER NOTES
I performed a history and physical examination of the patient and discussed management with the resident. I reviewed the residents note and agree with the documented findings and plan of care. Any areas of disagreement are noted on the chart. I was personally present for the key portions of any procedures. I have documented in the chart those procedures where I was not present during the key portions. I have reviewed the emergency nurses triage note. I agree with the chief complaint, past medical history, past surgical history, allergies, medications, social and family history as documented unless otherwise noted below. Documentation of the HPI, Physical Exam and Medical Decision Making performed by medical students or scribes is based on my personal performance of the HPI, PE and MDM. For Phys Assistant/ Nurse Practitioner cases/documentation I have personally evaluated this patient and have completed at least one if not all key elements of the E/M (history, physical exam, and MDM). I find the patient's history and physical exam are consistent with the NP/PA documentation. I agree with the care provided, treatment rendered, disposition and followup plan. Additional findings are as noted. Nathalie Chambers. Robert Fitzgerald MD  Attending Emergency  Physician        EKG Interpretation    Interpreted by me    Rhythm: normal sinus   Rate: normal  Axis: normal  Ectopy: none  Conduction: normal  ST Segments: no acute change  T Waves: no acute change  Q Waves: none    Clinical Impression: no acute changes and normal EKG. no clinically significant morphologic changes noted when compared with prior tracing dated 7 January 2022. This patient presented to the emergency department with complaints of nasal congestion, cough productive of clear sputum, wheezing/shortness of breath, rhinorrhea for the past several weeks.   Patient provides a history that she recently acquired a roommate who has a cat and for the past several weeks she

## 2023-05-27 NOTE — ED PROVIDER NOTES
[facility] ED  Emergency Department Encounter  Emergency Medicine Resident     Pt Name:Lena Gleason  MRN: [id number]  [place] 1995  Date of evaluation: 5/27/23  PCP:  Oscar Dowell MD  Note Started: 3:30 PM EDT      CHIEF COMPLAINT       Chief Complaint   Patient presents with    Shortness of Breath     Asthma HX and cat allergy       HISTORY OF PRESENT ILLNESS  (Location/Symptom, Timing/Onset, Context/Setting, Quality, Duration, Modifying Factors, Severity.)      Magnus Reinoso is a 33 y.o. female who presents with shortness of breath and wheezing that has been going on for a couple weeks but is acutely worse. Patient has a hx of asthma and has been using her inhaler however she recently moved back in with her roommate who has a cat and this has worsened her symptoms as she has known cat allergy. She denies chest pain, URI symptoms however states she feels like when she had covid. PAST MEDICAL / SURGICAL / SOCIAL / FAMILY HISTORY      has a past medical history of Asthma. has no past surgical history on file.       Social History     Socioeconomic History    Marital status: Single     Spouse name: Not on file    Number of children: Not on file    Years of education: Not on file    Highest education level: Not on file   Occupational History    Not on file   Tobacco Use    Smoking status: Some Days     Types: Cigarettes    Smokeless tobacco: Never    Tobacco comments:     Quit one year ago   Vaping Use    Vaping Use: Never used   Substance and Sexual Activity    Alcohol use: Yes     Comment: socially    Drug use: Not Currently     Types: Marijuana Delpha Reaper)    Sexual activity: Yes     Partners: Female   Other Topics Concern    Not on file   Social History Narrative    Not on file     Social Determinants of Health     Financial Resource Strain: Not on file   Food Insecurity: Not on file   Transportation Needs: Not on file   Physical Activity: Not on file   Stress: Not on file

## 2023-05-27 NOTE — ED NOTES
The following labs were labeled with appropriate pt sticker and tubed to lab:     [] Blue     [] Lavender   [] on ice  [] Green/yellow  [] Green/black [] on ice  [] [place]  [] on ice  [] Yellow  [] Red  [] Type/ Screen  [] ABG  [] VBG    [x] COVID-19 swab    [x] Rapid  [] PCR  [] Flu swab  [] Peds Viral Panel     [] Urine Sample  [] Fecal Sample  [] Pelvic Cultures  [] Blood Cultures  [] X 2  [] STREP Cultures       Heriberto Valle RN  05/27/23 6301

## 2023-05-27 NOTE — ED NOTES
Pt to ED via self with with c/o allergic reaction to cat  Pt reports living with roommate who has cat in same building  Reports shortness of breath, itching  States she takes a daily zyrtec  S/s have increased over past week  Took breathing tx PTA   Pt is a/ox4, ambulatory, RR even and non labored, attached to continuous pulse ox, call light in reach      Geremias Carvalho RN  05/27/23 [id number]

## 2023-06-01 LAB
EKG ATRIAL RATE: 74 BPM
EKG P AXIS: 57 DEGREES
EKG P-R INTERVAL: 148 MS
EKG Q-T INTERVAL: 386 MS
EKG QRS DURATION: 80 MS
EKG QTC CALCULATION (BAZETT): 428 MS
EKG R AXIS: 54 DEGREES
EKG T AXIS: 45 DEGREES
EKG VENTRICULAR RATE: 74 BPM

## 2023-06-01 PROCEDURE — 93010 ELECTROCARDIOGRAM REPORT: CPT | Performed by: INTERNAL MEDICINE

## 2023-09-20 ENCOUNTER — HOSPITAL ENCOUNTER (OUTPATIENT)
Age: 28
Setting detail: SPECIMEN
Discharge: HOME | End: 2023-09-20

## 2023-09-21 LAB
CANDIDA SPECIES: NEGATIVE
GARDNERELLA VAGINALIS: POSITIVE
SOURCE: ABNORMAL
SOURCE: NORMAL
TRICHOMONAS VAGINALI, MOLECULAR: NEGATIVE
TRICHOMONAS: NEGATIVE

## 2023-09-22 LAB
CHLAMYDIA DNA UR QL NAA+PROBE: NEGATIVE
N GONORRHOEA DNA UR QL NAA+PROBE: NEGATIVE
SPECIMEN DESCRIPTION: NORMAL

## 2024-03-14 ENCOUNTER — HOSPITAL ENCOUNTER (EMERGENCY)
Age: 29
Discharge: HOME | End: 2024-03-14
Attending: EMERGENCY MEDICINE
Payer: COMMERCIAL

## 2024-03-14 ENCOUNTER — APPOINTMENT (OUTPATIENT)
Dept: GENERAL RADIOLOGY | Age: 29
End: 2024-03-14
Payer: COMMERCIAL

## 2024-03-14 VITALS
RESPIRATION RATE: 17 BRPM | WEIGHT: 169.97 LBS | TEMPERATURE: 97.8 F | HEART RATE: 84 BPM | BODY MASS INDEX: 26.62 KG/M2 | OXYGEN SATURATION: 96 % | SYSTOLIC BLOOD PRESSURE: 136 MMHG | DIASTOLIC BLOOD PRESSURE: 85 MMHG

## 2024-03-14 DIAGNOSIS — R07.9 CHEST PAIN, UNSPECIFIED TYPE: Primary | ICD-10-CM

## 2024-03-14 PROCEDURE — 71046 X-RAY EXAM CHEST 2 VIEWS: CPT

## 2024-03-14 PROCEDURE — 93005 ELECTROCARDIOGRAM TRACING: CPT | Performed by: EMERGENCY MEDICINE

## 2024-03-14 PROCEDURE — 99284 EMERGENCY DEPT VISIT MOD MDM: CPT

## 2024-03-14 PROCEDURE — 6370000000 HC RX 637 (ALT 250 FOR IP): Performed by: EMERGENCY MEDICINE

## 2024-03-14 RX ORDER — HYDROXYZINE HYDROCHLORIDE 10 MG/1
10 TABLET, FILM COATED ORAL ONCE
Status: COMPLETED | OUTPATIENT
Start: 2024-03-14 | End: 2024-03-14

## 2024-03-14 RX ADMIN — HYDROXYZINE HYDROCHLORIDE 10 MG: 10 TABLET, FILM COATED ORAL at 11:19

## 2024-03-14 ASSESSMENT — ENCOUNTER SYMPTOMS
SHORTNESS OF BREATH: 0
VOMITING: 0
ABDOMINAL PAIN: 0
NAUSEA: 1
COUGH: 1

## 2024-03-14 NOTE — ED PROVIDER NOTES
[facility]     Emergency Department     Faculty Attestation    I performed a history and physical examination of the patient and discussed management with the resident. I have reviewed and agree with the resident’s findings including all diagnostic interpretations, and treatment plans as written at the time of my review. Any areas of disagreement are noted on the chart. I was personally present for the key portions of any procedures. I have documented in the chart those procedures where I was not present during the key portions. For Physician Assistant/ Nurse Practitioner cases/documentation I have personally evaluated this patient and have completed at least one if not all key elements of the E/M (history, physical exam, and MDM). Additional findings are as noted.    PtName: Ena Handwork  MRN: [id number]  Birthdate 1995  Date of evaluation: 3/14/24  Note Started: 11:05 AM EDT    Primary Care Physician: Tim Oakes MD        History: This is a 29 y.o. female who presents to the Emergency Department with complaint of chest pain, back pain, left arm tingling.  Patient states that the left arm tingling for started today.  She states she also noticed some discoloration which is subsequent resolved.  Patient states she does have a history of anxiety.  Patient's been having the symptoms off and on for the last month.  She does complain of occasional mild cough that is been nonproductive.  She denies any fever, chills or sweats.  Coughing can sometimes exacerbates her chest and upper back pain.  Nothing helps to relieve her pain.  The pain can last anywhere from 15 minutes to an hour.  She denies any recent long travel or pain or swelling her legs.    Physical:   weight is 77.1 kg (169 lb 15.6 oz). Her oral temperature is 97.8 °F (36.6 °C). Her blood pressure is 136/85 and her pulse is 84. Her respiration is 17 and oxygen saturation is 96%.  Lungs

## 2024-03-14 NOTE — ED PROVIDER NOTES
[facility] ED  Emergency Department Encounter  Emergency Medicine Resident     Pt Name:Ena Gleason  MRN: [id number]  Birthdate 1995  Date of evaluation: 3/14/24  PCP:  Tim Oakes MD  Note Started: 10:53 AM EDT      CHIEF COMPLAINT       Chief Complaint   Patient presents with    Chest Pain    Dizziness    Neck Pain    Numbness     LT arm/hand        HISTORY OF PRESENT ILLNESS  (Location/Symptom, Timing/Onset, Context/Setting, Quality, Duration, Modifying Factors, Severity.)      Ena Gleason is a 29 y.o. female who presents with chest pain.  Patient has a history of \"years\" of chest pain.  States it has changed over the last few weeks.  Patient now stating she has left-sided chest pain with some periscapular pain as well.  States this morning, she had an episode where her fingers became cold and tingly which then spread up her arm.  Patient has a history of hypertension for which she takes hydrochlorothiazide.  Patient also takes BuSpar for anxiety.  Patient has no family history of early heart disease.  Patient does use Depo-Provera shots for contraception.  Patient also reports no lower extremity swelling or productive cough.  States she has had a little bit of a cough today.  However it has been nonproductive.    PAST MEDICAL / SURGICAL / SOCIAL / FAMILY HISTORY      has a past medical history of Anxiety, Asthma, and IBS (irritable bowel syndrome).       has a past surgical history that includes [place] tooth extraction.      Social History     Socioeconomic History    Marital status: Single     Spouse name: Not on file    Number of children: Not on file    Years of education: Not on file    Highest education level: Not on file   Occupational History    Not on file   Tobacco Use    Smoking status: Some Days     Types: Cigarettes    Smokeless tobacco: Never    Tobacco comments:     Quit one year ago   Vaping Use    Vaping Use: Never used   Substance and Sexual Activity  risk by [place] and PERC negative.  No concern for pulmonary embolism at this time.  Has no family history of early cardiac disease and her only risk factor is hypertension, low concern for ACS.  Will obtain EKG and chest x-ray to rule out any arrhythmia, ST segment changes, will also obtain chest x-ray to assess for any underlying pneumonia or pneumothorax.  Patient does appear to be quite anxious at this time, will give the patient a dose of hydroxyzine.  Anticipate likely discharge.    Amount and/or Complexity of Data Reviewed  Radiology: ordered.  ECG/medicine tests: ordered.    Risk  Prescription drug management.        EKG  EKG showing normal sinus rhythm, normal rate, normal axis, normal intervals.  No acute ST segment changes.  There is T wave inversion in V1 and V2.  On review this appears unchanged from prior examinations.    All EKG's are interpreted by the Emergency Department Physician who either signs or Co-signs this chart in the absence of a cardiologist.    EMERGENCY DEPARTMENT COURSE:    ED Course as of 03/17/24 1051   Thu Mar 14, 2024   1310 No acute process.     Discussed workup with the patient.  Encouraged the patient to follow-up with her primary care provider for further workup of this longstanding intermittent chest pain that she has had.  Patient verbalized understanding all questions were answered. [BL]      ED Course User Index  [BL] Augustina Licea DO       PROCEDURES:      CONSULTS:  None    CRITICAL CARE:  There was significant risk of life threatening deterioration of patient's condition requiring my direct management. Critical care time  minutes, excluding any documented procedures.    FINAL IMPRESSION      1. Chest pain, unspecified type          DISPOSITION / PLAN     DISPOSITION Decision To Discharge 03/14/2024 01:10:45 PM      PATIENT REFERRED TO:  Tim Oakes MD  [address] [facility] [address]  [phone]    In 1 week  For post-ER visit assessment    [facility]

## 2024-03-15 LAB
EKG ATRIAL RATE: 79 BPM
EKG P AXIS: 43 DEGREES
EKG P-R INTERVAL: 134 MS
EKG Q-T INTERVAL: 388 MS
EKG QRS DURATION: 82 MS
EKG QTC CALCULATION (BAZETT): 444 MS
EKG R AXIS: 44 DEGREES
EKG T AXIS: 38 DEGREES
EKG VENTRICULAR RATE: 79 BPM

## 2024-03-15 PROCEDURE — 93010 ELECTROCARDIOGRAM REPORT: CPT | Performed by: INTERNAL MEDICINE

## 2024-05-12 ENCOUNTER — HOSPITAL ENCOUNTER (EMERGENCY)
Age: 29
Discharge: HOME | End: 2024-05-12
Attending: EMERGENCY MEDICINE
Payer: COMMERCIAL

## 2024-05-12 VITALS
SYSTOLIC BLOOD PRESSURE: 134 MMHG | BODY MASS INDEX: 28.04 KG/M2 | WEIGHT: 178.68 LBS | DIASTOLIC BLOOD PRESSURE: 88 MMHG | OXYGEN SATURATION: 98 % | HEART RATE: 98 BPM | RESPIRATION RATE: 18 BRPM | TEMPERATURE: 99.3 F | HEIGHT: 67 IN

## 2024-05-12 DIAGNOSIS — S09.90XA INJURY OF HEAD, INITIAL ENCOUNTER: Primary | ICD-10-CM

## 2024-05-12 PROCEDURE — 99283 EMERGENCY DEPT VISIT LOW MDM: CPT

## 2024-05-12 PROCEDURE — 6370000000 HC RX 637 (ALT 250 FOR IP)

## 2024-05-12 RX ORDER — IBUPROFEN 400 MG/1
400 TABLET ORAL ONCE
Status: COMPLETED | OUTPATIENT
Start: 2024-05-12 | End: 2024-05-12

## 2024-05-12 RX ORDER — ACETAMINOPHEN 500 MG
1000 TABLET ORAL ONCE
Status: COMPLETED | OUTPATIENT
Start: 2024-05-12 | End: 2024-05-12

## 2024-05-12 RX ADMIN — ACETAMINOPHEN 1000 MG: 500 TABLET ORAL at 16:10

## 2024-05-12 RX ADMIN — IBUPROFEN 400 MG: 400 TABLET, FILM COATED ORAL at 16:10

## 2024-05-12 ASSESSMENT — PAIN SCALES - GENERAL: PAINLEVEL_OUTOF10: 3

## 2024-05-12 ASSESSMENT — PAIN - FUNCTIONAL ASSESSMENT: PAIN_FUNCTIONAL_ASSESSMENT: 0-10

## 2024-05-12 ASSESSMENT — PAIN DESCRIPTION - DESCRIPTORS: DESCRIPTORS: ACHING;THROBBING

## 2024-05-12 NOTE — ED PROVIDER NOTES
[facility] ED  Emergency Department Encounter  Emergency Medicine Resident     Pt Name:Ena Gleason  MRN: [id number]  Birthdate 1995  Date of evaluation: 5/12/24  PCP:  Tim Oakes MD  Note Started: 3:39 PM EDT      CHIEF COMPLAINT       Chief Complaint   Patient presents with    Fall     mechanical fall Saturday and fell face forward onto floor  NO LOC  reported nuasea   \"slight headache\"       Headache       HISTORY OF PRESENT ILLNESS  (Location/Symptom, Timing/Onset, Context/Setting, Quality, Duration, Modifying Factors, Severity.)      Ena Gleason is a 29 y.o. female who presents with concern for head injury after falling down 2 steps yesterday afternoon.  Patient states she lost her footing fell down 2 steps hitting tip of her nose forehead and the left side of her forehead.  No loss of consciousness, no nausea or vomiting after incident.  Denies any midline neck tenderness and has full range of motion of the neck.  Had few drops of blood out of the right nare afterwards but was able to get up and go about her day without difficulty.  Woke up today with a slight headache as well as some dizziness occasionally.  Has had mild nausea occasionally but no vomiting.  No changes to vision.  Has been steady on her feet.    Past medical history of anxiety.  Has not taken any Tylenol or Motrin for her headache.  Has been with maintain good oral intake.    PAST MEDICAL / SURGICAL / SOCIAL / FAMILY HISTORY      has a past medical history of Anxiety, Asthma, and IBS (irritable bowel syndrome).     has a past surgical history that includes [place] tooth extraction.    Social History     Socioeconomic History    Marital status: Single     Spouse name: Not on file    Number of children: Not on file    Years of education: Not on file    Highest education level: Not on file   Occupational History    Not on file   Tobacco Use    Smoking status: Some Days     Types: Cigarettes

## 2024-05-12 NOTE — ED TRIAGE NOTES
30 yo female arrived to ED through triage with c/o headache, dizziness, and nausea after falling down 3 stairs yesterday.  Patient states she tripped over foot, fell forward down 3 steps, and landed on knees and face.  Patient denies any LOC.   Patient denies taking any blood thinner and/or ASA  Patient is alert and oriented times 4, speaking full sentences, and answering questions appropriately.

## 2024-05-12 NOTE — ED PROVIDER NOTES
[facility]  Emergency Department  Faculty Attestation     I performed a history and physical examination of the patient and discussed management with the resident. I reviewed the resident’s note and agree with the documented findings and plan of care. Any areas of disagreement are noted on the chart. I was personally present for the key portions of any procedures. I have documented in the chart those procedures where I was not present during the key portions. I have reviewed the emergency nurses triage note. I agree with the chief complaint, past medical history, past surgical history, allergies, medications, social and family history as documented unless otherwise noted below.    For Physician Assistant/ Nurse Practitioner cases/documentation I have personally evaluated this patient and have completed at least one if not all key elements of the E/M (history, physical exam, and MDM). Additional findings are as noted.    Preliminary note started at 3:56 PM EDT    Primary Care Physician:  Tim Oakes MD    Screenings:  [unfilled]    CHIEF COMPLAINT       Chief Complaint   Patient presents with    Fall     mechanical fall Saturday and fell face forward onto floor  NO LOC  reported nuasea   \"slight headache\"       Headache       RECENT VITALS:   /88   Pulse 98   Temp 99.3 °F (37.4 °C) (Oral)   Resp 18   Ht 1.702 m (5' 7\")   Wt 81.1 kg (178 lb 10.9 oz)   SpO2 98%   BMI 27.99 kg/m²     LABS:  Labs Reviewed - No data to display    Radiology  No orders to display         Attending Physician Additional  Notes    Patient failed on the last 2 steps of her stairs landing on her face.  She has mild facial discomfort especially by the frontal sinus.  There is a drop of blood from the right side of the nose.  There is no loss of consciousness.  No neck pain.  No visual changes.  No weakness.  No other injuries.  No laceration or abrasion.  On exam she is nontoxic

## 2024-05-12 NOTE — DISCHARGE INSTRUCTIONS
You were seen in the emergency room after falling down 2 steps yesterday.  You had no loss of consciousness and are currently experiencing some mild headache and dizziness.  No further imaging is required at this time.  You likely have a mild concussion and experiencing concussive symptoms.    Please return to the emergency room should you have worsening of symptoms, loss of consciousness or new symptoms of concern.    Reasons to return to the ED:  Decreasing, fluctuating, or loss of consciousness   Increasing confusion or irritability   Numbness in arms or legs   Pupils become unequal in size   Repeated vomiting   Seizures   Slurred speech or inability to speak   Inability to recognize people or places   Worsening headaches     IT IS OK TO:   Go to sleep   Use acetaminophen (Tylenol) for headaches   Use ice pack on head or neck   Eat a light diet   Return to school     THERE IS NO NEED TO:   Check eyes with flashlight   Wake up every hour   Test reflexes   Remain in bed     DO NOT:   Workout or play sports until you are back to normal  Use technology (i.e. no computer, texting, video games, and television)   Drink alcohol or use recreational substances

## 2024-05-13 ASSESSMENT — ENCOUNTER SYMPTOMS
RHINORRHEA: 0
SHORTNESS OF BREATH: 0
VOMITING: 0
NAUSEA: 1
COUGH: 0
CHEST TIGHTNESS: 0
ABDOMINAL PAIN: 0

## 2024-07-27 ENCOUNTER — APPOINTMENT (OUTPATIENT)
Dept: GENERAL RADIOLOGY | Age: 29
End: 2024-07-27
Payer: COMMERCIAL

## 2024-07-27 ENCOUNTER — HOSPITAL ENCOUNTER (EMERGENCY)
Age: 29
Discharge: HOME | End: 2024-07-27
Attending: EMERGENCY MEDICINE
Payer: COMMERCIAL

## 2024-07-27 VITALS
TEMPERATURE: 98.1 F | SYSTOLIC BLOOD PRESSURE: 137 MMHG | RESPIRATION RATE: 17 BRPM | OXYGEN SATURATION: 100 % | DIASTOLIC BLOOD PRESSURE: 94 MMHG | HEART RATE: 92 BPM

## 2024-07-27 DIAGNOSIS — E87.6 HYPOKALEMIA: ICD-10-CM

## 2024-07-27 DIAGNOSIS — R00.0 TACHYCARDIA: Primary | ICD-10-CM

## 2024-07-27 LAB
ANION GAP SERPL CALCULATED.3IONS-SCNC: 19 MMOL/L (ref 9–16)
BASOPHILS # BLD: 0.06 K/UL (ref 0–0.2)
BASOPHILS NFR BLD: 1 % (ref 0–2)
BUN SERPL-MCNC: 9 MG/DL (ref 6–20)
CALCIUM SERPL-MCNC: 9.4 MG/DL (ref 8.6–10.4)
CHLORIDE SERPL-SCNC: 100 MMOL/L (ref 98–107)
CO2 SERPL-SCNC: 17 MMOL/L (ref 20–31)
CREAT SERPL-MCNC: 0.8 MG/DL (ref 0.5–0.9)
EOSINOPHIL # BLD: 0.09 K/UL (ref 0–0.44)
EOSINOPHILS RELATIVE PERCENT: 1 % (ref 1–4)
ERYTHROCYTE [DISTWIDTH] IN BLOOD BY AUTOMATED COUNT: 12.2 % (ref 11.8–14.4)
GFR, ESTIMATED: >90 ML/MIN/1.73M2
GLUCOSE SERPL-MCNC: 97 MG/DL (ref 74–99)
HCG SERPL QL: NEGATIVE
HCT VFR BLD AUTO: 42.3 % (ref 36.3–47.1)
HGB BLD-MCNC: 14.7 G/DL (ref 11.9–15.1)
IMM GRANULOCYTES # BLD AUTO: 0.04 K/UL (ref 0–0.3)
IMM GRANULOCYTES NFR BLD: 0 %
LYMPHOCYTES NFR BLD: 2.36 K/UL (ref 1.1–3.7)
LYMPHOCYTES RELATIVE PERCENT: 23 % (ref 24–43)
MAGNESIUM SERPL-MCNC: 1.8 MG/DL (ref 1.6–2.6)
MCH RBC QN AUTO: 34.3 PG (ref 25.2–33.5)
MCHC RBC AUTO-ENTMCNC: 34.8 G/DL (ref 28.4–34.8)
MCV RBC AUTO: 98.6 FL (ref 82.6–102.9)
MONOCYTES NFR BLD: 0.57 K/UL (ref 0.1–1.2)
MONOCYTES NFR BLD: 6 % (ref 3–12)
NEUTROPHILS NFR BLD: 69 % (ref 36–65)
NEUTS SEG NFR BLD: 7.23 K/UL (ref 1.5–8.1)
NRBC BLD-RTO: 0 PER 100 WBC
PLATELET # BLD AUTO: 355 K/UL (ref 138–453)
PMV BLD AUTO: 9.1 FL (ref 8.1–13.5)
POTASSIUM SERPL-SCNC: 2.9 MMOL/L (ref 3.7–5.3)
POTASSIUM SERPL-SCNC: 3.5 MMOL/L (ref 3.7–5.3)
RBC # BLD AUTO: 4.29 M/UL (ref 3.95–5.11)
SODIUM SERPL-SCNC: 136 MMOL/L (ref 136–145)
T4 FREE SERPL-MCNC: 1.6 NG/DL (ref 0.92–1.68)
TROPONIN I SERPL HS-MCNC: <6 NG/L (ref 0–14)
TSH SERPL DL<=0.05 MIU/L-ACNC: 1.62 UIU/ML (ref 0.27–4.2)
WBC OTHER # BLD: 10.4 K/UL (ref 3.5–11.3)

## 2024-07-27 PROCEDURE — 96366 THER/PROPH/DIAG IV INF ADDON: CPT | Performed by: EMERGENCY MEDICINE

## 2024-07-27 PROCEDURE — 96365 THER/PROPH/DIAG IV INF INIT: CPT | Performed by: EMERGENCY MEDICINE

## 2024-07-27 PROCEDURE — 96361 HYDRATE IV INFUSION ADD-ON: CPT | Performed by: EMERGENCY MEDICINE

## 2024-07-27 PROCEDURE — 6370000000 HC RX 637 (ALT 250 FOR IP)

## 2024-07-27 PROCEDURE — 93005 ELECTROCARDIOGRAM TRACING: CPT

## 2024-07-27 PROCEDURE — 80048 BASIC METABOLIC PNL TOTAL CA: CPT

## 2024-07-27 PROCEDURE — 84443 ASSAY THYROID STIM HORMONE: CPT

## 2024-07-27 PROCEDURE — 84703 CHORIONIC GONADOTROPIN ASSAY: CPT

## 2024-07-27 PROCEDURE — 84132 ASSAY OF SERUM POTASSIUM: CPT

## 2024-07-27 PROCEDURE — 99285 EMERGENCY DEPT VISIT HI MDM: CPT | Performed by: EMERGENCY MEDICINE

## 2024-07-27 PROCEDURE — 85025 COMPLETE CBC W/AUTO DIFF WBC: CPT

## 2024-07-27 PROCEDURE — 84484 ASSAY OF TROPONIN QUANT: CPT

## 2024-07-27 PROCEDURE — 6360000002 HC RX W HCPCS

## 2024-07-27 PROCEDURE — 84439 ASSAY OF FREE THYROXINE: CPT

## 2024-07-27 PROCEDURE — 2580000003 HC RX 258

## 2024-07-27 PROCEDURE — 71046 X-RAY EXAM CHEST 2 VIEWS: CPT

## 2024-07-27 PROCEDURE — 83735 ASSAY OF MAGNESIUM: CPT

## 2024-07-27 RX ORDER — SODIUM CHLORIDE, SODIUM LACTATE, POTASSIUM CHLORIDE, AND CALCIUM CHLORIDE .6; .31; .03; .02 G/100ML; G/100ML; G/100ML; G/100ML
1000 INJECTION, SOLUTION INTRAVENOUS ONCE
Status: COMPLETED | OUTPATIENT
Start: 2024-07-27 | End: 2024-07-27

## 2024-07-27 RX ORDER — ACETAMINOPHEN 325 MG/1
650 TABLET ORAL ONCE
Status: COMPLETED | OUTPATIENT
Start: 2024-07-27 | End: 2024-07-27

## 2024-07-27 RX ORDER — POTASSIUM CHLORIDE 7.45 MG/ML
10 INJECTION INTRAVENOUS
Status: DISCONTINUED | OUTPATIENT
Start: 2024-07-27 | End: 2024-07-27

## 2024-07-27 RX ORDER — POTASSIUM CHLORIDE 20 MEQ/1
40 TABLET, EXTENDED RELEASE ORAL ONCE
Status: DISCONTINUED | OUTPATIENT
Start: 2024-07-27 | End: 2024-07-27

## 2024-07-27 RX ORDER — POTASSIUM CHLORIDE 7.45 MG/ML
10 INJECTION INTRAVENOUS
Status: COMPLETED | OUTPATIENT
Start: 2024-07-27 | End: 2024-07-27

## 2024-07-27 RX ADMIN — POTASSIUM BICARBONATE 40 MEQ: 782 TABLET, EFFERVESCENT ORAL at 19:40

## 2024-07-27 RX ADMIN — SODIUM CHLORIDE, POTASSIUM CHLORIDE, SODIUM LACTATE AND CALCIUM CHLORIDE 1000 ML: 600; 310; 30; 20 INJECTION, SOLUTION INTRAVENOUS at 18:46

## 2024-07-27 RX ADMIN — POTASSIUM CHLORIDE 10 MEQ: 7.46 INJECTION, SOLUTION INTRAVENOUS at 19:27

## 2024-07-27 RX ADMIN — POTASSIUM CHLORIDE 10 MEQ: 7.46 INJECTION, SOLUTION INTRAVENOUS at 20:31

## 2024-07-27 RX ADMIN — POTASSIUM BICARBONATE 20 MEQ: 782 TABLET, EFFERVESCENT ORAL at 22:23

## 2024-07-27 RX ADMIN — ACETAMINOPHEN 650 MG: 325 TABLET ORAL at 18:46

## 2024-07-27 ASSESSMENT — PAIN DESCRIPTION - ORIENTATION: ORIENTATION: LEFT

## 2024-07-27 ASSESSMENT — ENCOUNTER SYMPTOMS
NAUSEA: 0
SHORTNESS OF BREATH: 0
ABDOMINAL PAIN: 0
RHINORRHEA: 0
VOMITING: 0
DIARRHEA: 1

## 2024-07-27 ASSESSMENT — PAIN DESCRIPTION - LOCATION: LOCATION: CHEST;STERNUM

## 2024-07-27 ASSESSMENT — PAIN - FUNCTIONAL ASSESSMENT: PAIN_FUNCTIONAL_ASSESSMENT: 0-10

## 2024-07-27 ASSESSMENT — PAIN SCALES - GENERAL
PAINLEVEL_OUTOF10: 4
PAINLEVEL_OUTOF10: 4

## 2024-07-27 ASSESSMENT — PAIN DESCRIPTION - DESCRIPTORS: DESCRIPTORS: TIGHTNESS;SHARP

## 2024-07-27 ASSESSMENT — PAIN DESCRIPTION - PAIN TYPE: TYPE: ACUTE PAIN

## 2024-07-27 ASSESSMENT — LIFESTYLE VARIABLES: HOW OFTEN DO YOU HAVE A DRINK CONTAINING ALCOHOL: NEVER

## 2024-07-27 NOTE — ED NOTES
The following labs were labeled with appropriate pt sticker and tubed to lab:     [x] Blue     [x] Lavender   [] on ice  [x] Green/yellow  [] Green/black [] on ice  [] Carlton  [] on ice  [x] Yellow  [] Red  [] Pink  [] Type/ Screen  [] ABG  [] VBG    [] COVID-19 swab    [] Rapid  [] PCR  [] Flu swab  [] Peds Viral Panel     [] Urine Sample  [] Fecal Sample  [] Pelvic Cultures  [] Blood Cultures  [] X 2  [] STREP Cultures  [] Wound Cultures

## 2024-07-27 NOTE — ED PROVIDER NOTES
[facility] ED     Emergency Department     Faculty Attestation        I performed a history and physical examination of the patient and discussed management with the resident. I reviewed the resident’s note and agree with the documented findings and plan of care. Any areas of disagreement are noted on the chart. I was personally present for the key portions of any procedures. I have documented in the chart those procedures where I was not present during the key portions. I have reviewed the emergency nurses triage note. I agree with the chief complaint, past medical history, past surgical history, allergies, medications, social and family history as documented unless otherwise noted below.    For mid-level providers such as nurse practitioners as well as physicians assistants:    I have personally seen and evaluated the patient.    I find the patient's history and physical exam are consistent with NP/PA documentation.  I agree with the care provided, treatment rendered, disposition, & follow-up plan.     Additional findings are as noted.    Vital Signs: BP (!) 137/94   Pulse (!) 101   Temp 98.1 °F (36.7 °C) (Oral)   Resp 16   SpO2 100%   PCP:  Tim Oakes MD    Pertinent Comments:     EKG Interpretation    Interpreted by me    Rhythm: normal sinus   Rate: Tachycardia  Axis: normal  Ectopy: none  Conduction: normal  ST Segments: no acute change  T Waves: no acute change  Q Waves: none    Clinical Impression: Nonspecific EKG        Lenny Contreras MD    Attending Emergency Medicine Physician            Asif Contreras MD  07/27/24 2236

## 2024-07-27 NOTE — ED PROVIDER NOTES
Positive for chest pain. Negative for leg swelling.   Gastrointestinal:  Positive for diarrhea (IBS at baseline). Negative for abdominal pain, nausea and vomiting.   Genitourinary:  Positive for decreased urine volume. Negative for flank pain and pelvic pain.   Neurological:  Positive for light-headedness. Negative for headaches.       PHYSICAL EXAM      INITIAL VITALS:   BP (!) 137/94   Pulse 92   Temp 98.1 °F (36.7 °C) (Oral)   Resp 17   SpO2 100%     Physical Exam  Vitals reviewed.   Constitutional:       General: She is not in acute distress.     Appearance: Normal appearance. She is not ill-appearing.   HENT:      Head: Normocephalic and atraumatic.      Right Ear: External ear normal.      Left Ear: External ear normal.      Nose: Nose normal.      Mouth/Throat:      Mouth: Mucous membranes are dry.   Eyes:      Conjunctiva/sclera: Conjunctivae normal.   Cardiovascular:      Rate and Rhythm: Regular rhythm. Tachycardia present.      Pulses: Normal pulses.      Heart sounds: Normal heart sounds.      No friction rub.   Pulmonary:      Effort: Pulmonary effort is normal. No respiratory distress.      Breath sounds: Normal breath sounds. No wheezing, rhonchi or rales.   Abdominal:      General: Abdomen is flat. There is no distension.      Palpations: Abdomen is soft.      Tenderness: There is no abdominal tenderness. There is no guarding.   Musculoskeletal:         General: No swelling or tenderness (no calf ttp).      Cervical back: Neck supple. No tenderness.      Right lower leg: No edema.      Left lower leg: No edema.      Comments: Moves all extremities   Skin:     General: Skin is warm and dry.      Capillary Refill: Capillary refill takes less than 2 seconds.   Neurological:      Mental Status: She is alert.           DDX/DIAGNOSTIC RESULTS / EMERGENCY DEPARTMENT COURSE / MDM     Medical Decision Making  This is a 29 y.o. female past medical tree of hypertension, asthma, hypothyroid who presents with  [facility] ED  [address] [address]  [phone]    As needed, If symptoms worsen    Your cardiologist    Go to   your previously scheduled appointment.      DISCHARGE MEDICATIONS:  New Prescriptions    No medications on file       Cherie Herrera DO  Emergency Medicine Resident    (Please note that portions of thisnote were completed with a voice recognition program.  Efforts were made to edit the dictations but occasionally words are mis-transcribed.)

## 2024-07-28 NOTE — ED NOTES
Pt reports to the ED with complaints of tachycardia. Pt states her HR at home was in the 130's and 140's. Pt admits to some chest pain, denies SOB or dizziness at this time. Pt states she has a hx of tachycardia d/t hypokalemia. Pt also states about two weeks ago she was taken off propanolol and placed on lisinopril instead. Pt denies any other symptoms at this time. Pt is A&O x4 and speaking in complete sentences. Pt is resting in bed comfortably, NAD noted. EKG obtained in triage. Pt placed on full cardiac monitor. Care ongoing

## 2024-07-28 NOTE — DISCHARGE INSTRUCTIONS
Take your medication as indicated.  For pain use ibuprofen (Motrin / Advil) or acetaminophen (Tylenol), unless prescribed medications that have acetaminophen in it.  You can take over the counter acetaminophen tablets (1 - 2 tablets of the 500-mg strength every 6 hours) or ibuprofen tablets (2 tablets every 4 hours).    Make sure you are drinking enough water throughout the day.    PLEASE RETURN TO THE EMERGENCY DEPARTMENT IMMEDIATELY for worsening symptoms of increasing pain, shortness of breath, feeling of your heart fluttering or racing, swelling to your feet, unable to lay flat, or if you develop any concerning symptoms such as: high fever not relieved by acetaminophen (Tylenol) and/or ibuprofen (Motrin / Advil), chills, persistent nausea and/or vomiting, loss of consciousness, numbness, weakness or tingling in the arms or legs or change in color of the extremities, changes in mental status, persistent headache, blurry vision, loss of bladder / bowel control, unable to follow up with your physician, or other any other care or concern.

## 2024-07-30 LAB
EKG ATRIAL RATE: 115 BPM
EKG P AXIS: 60 DEGREES
EKG P-R INTERVAL: 134 MS
EKG Q-T INTERVAL: 314 MS
EKG QRS DURATION: 74 MS
EKG QTC CALCULATION (BAZETT): 434 MS
EKG R AXIS: 39 DEGREES
EKG T AXIS: 34 DEGREES
EKG VENTRICULAR RATE: 115 BPM

## 2024-08-20 ENCOUNTER — APPOINTMENT (OUTPATIENT)
Dept: GENERAL RADIOLOGY | Age: 29
End: 2024-08-20
Payer: COMMERCIAL

## 2024-08-20 ENCOUNTER — HOSPITAL ENCOUNTER (EMERGENCY)
Age: 29
Discharge: HOME | End: 2024-08-20
Attending: EMERGENCY MEDICINE
Payer: COMMERCIAL

## 2024-08-20 VITALS
HEART RATE: 95 BPM | DIASTOLIC BLOOD PRESSURE: 92 MMHG | RESPIRATION RATE: 17 BRPM | TEMPERATURE: 98.5 F | OXYGEN SATURATION: 97 % | SYSTOLIC BLOOD PRESSURE: 140 MMHG

## 2024-08-20 DIAGNOSIS — R07.9 CHEST PAIN, UNSPECIFIED TYPE: Primary | ICD-10-CM

## 2024-08-20 LAB
ANION GAP SERPL CALCULATED.3IONS-SCNC: 16 MMOL/L (ref 9–16)
BASOPHILS # BLD: 0.06 K/UL (ref 0–0.2)
BASOPHILS NFR BLD: 1 % (ref 0–2)
BUN SERPL-MCNC: 10 MG/DL (ref 6–20)
CALCIUM SERPL-MCNC: 9.6 MG/DL (ref 8.6–10.4)
CHLORIDE SERPL-SCNC: 103 MMOL/L (ref 98–107)
CO2 SERPL-SCNC: 20 MMOL/L (ref 20–31)
CREAT SERPL-MCNC: 0.9 MG/DL (ref 0.5–0.9)
EOSINOPHIL # BLD: 0.18 K/UL (ref 0–0.44)
EOSINOPHILS RELATIVE PERCENT: 3 % (ref 1–4)
ERYTHROCYTE [DISTWIDTH] IN BLOOD BY AUTOMATED COUNT: 11.9 % (ref 11.8–14.4)
GFR, ESTIMATED: >90 ML/MIN/1.73M2
GLUCOSE SERPL-MCNC: 106 MG/DL (ref 74–99)
HCT VFR BLD AUTO: 42.1 % (ref 36.3–47.1)
HGB BLD-MCNC: 14.3 G/DL (ref 11.9–15.1)
IMM GRANULOCYTES # BLD AUTO: <0.03 K/UL (ref 0–0.3)
IMM GRANULOCYTES NFR BLD: 0 %
LYMPHOCYTES NFR BLD: 2.8 K/UL (ref 1.1–3.7)
LYMPHOCYTES RELATIVE PERCENT: 38 % (ref 24–43)
MCH RBC QN AUTO: 33.8 PG (ref 25.2–33.5)
MCHC RBC AUTO-ENTMCNC: 34 G/DL (ref 28.4–34.8)
MCV RBC AUTO: 99.5 FL (ref 82.6–102.9)
MONOCYTES NFR BLD: 0.69 K/UL (ref 0.1–1.2)
MONOCYTES NFR BLD: 9 % (ref 3–12)
NEUTROPHILS NFR BLD: 49 % (ref 36–65)
NEUTS SEG NFR BLD: 3.58 K/UL (ref 1.5–8.1)
NRBC BLD-RTO: 0 PER 100 WBC
PLATELET # BLD AUTO: 367 K/UL (ref 138–453)
PMV BLD AUTO: 9.8 FL (ref 8.1–13.5)
POTASSIUM SERPL-SCNC: 3.3 MMOL/L (ref 3.7–5.3)
RBC # BLD AUTO: 4.23 M/UL (ref 3.95–5.11)
SODIUM SERPL-SCNC: 139 MMOL/L (ref 136–145)
TROPONIN I SERPL HS-MCNC: <6 NG/L (ref 0–14)
TROPONIN I SERPL HS-MCNC: <6 NG/L (ref 0–14)
WBC OTHER # BLD: 7.3 K/UL (ref 3.5–11.3)

## 2024-08-20 PROCEDURE — 85025 COMPLETE CBC W/AUTO DIFF WBC: CPT

## 2024-08-20 PROCEDURE — 71046 X-RAY EXAM CHEST 2 VIEWS: CPT

## 2024-08-20 PROCEDURE — 84484 ASSAY OF TROPONIN QUANT: CPT

## 2024-08-20 PROCEDURE — 93005 ELECTROCARDIOGRAM TRACING: CPT

## 2024-08-20 PROCEDURE — 99285 EMERGENCY DEPT VISIT HI MDM: CPT | Performed by: EMERGENCY MEDICINE

## 2024-08-20 PROCEDURE — 80048 BASIC METABOLIC PNL TOTAL CA: CPT

## 2024-08-20 ASSESSMENT — PAIN SCALES - GENERAL: PAINLEVEL_OUTOF10: 3

## 2024-08-20 ASSESSMENT — PAIN - FUNCTIONAL ASSESSMENT: PAIN_FUNCTIONAL_ASSESSMENT: 0-10

## 2024-08-20 NOTE — ED NOTES
Pt presented to ED via triage for the complaint of chest pain and dizziness. Pt states being seen for this in past and has cardiology appointment in 2 days. Pt ambulated with steady gait. RR even and non labored.

## 2024-08-21 NOTE — ED PROVIDER NOTES
[facility]     Emergency Department     Faculty Attestation    I performed a history and physical examination of the patient and discussed management with the resident. I reviewed the resident’s note and agree with the documented findings including all diagnostic interpretations and plan of care. Any areas of disagreement are noted on the chart. I was personally present for the key portions of any procedures. I have documented in the chart those procedures where I was not present during the key portions. I have reviewed the emergency nurses triage note. I agree with the chief complaint, past medical history, past surgical history, allergies, medications, social and family history as documented unless otherwise noted below. Documentation of the HPI, Physical Exam and Medical Decision Making performed by angelique is based on my personal performance of the HPI, PE and MDM. For Physician Assistant/ Nurse Practitioner cases/documentation I have personally evaluated this patient and have completed at least one if not all key elements of the E/M (history, physical exam, and MDM). Additional findings are as noted.    Primary Care Physician: Tim Oakes MD    Note Started: 12:32 AM EDT     VITAL SIGNS:   oral temperature is 98.5 °F (36.9 °C). Her blood pressure is 140/92 (abnormal) and her pulse is 95. Her respiration is 17 and oxygen saturation is 97%.      Medical Decision Making  Chest pain, shortness of breath.  Reports dizziness with this as well.  Patient reports increasing symptoms since discontinuation of propranolol in favor of lisinopril, reports primary physician with is concerned about potential interactions with beta agonist used for asthma.  Does have follow-up with cardiology in 1 week.  On exam cardiac exam regular rate and rhythm no murmurs rubs gallops, pulmonary clear bilaterally abdomen soft nontender nondistended, calves nontender 
    PROCEDURES:  ***    CONSULTS:  None    CRITICAL CARE:  There was significant risk of life threatening deterioration of patient's condition requiring my direct management. Critical care time *** minutes, excluding any documented procedures.    FINAL IMPRESSION      No diagnosis found.      DISPOSITION / PLAN     DISPOSITION    Condition at Disposition: Data Unavailable      PATIENT REFERRED TO:  No follow-up provider specified.    DISCHARGE MEDICATIONS:  New Prescriptions    No medications on file       Luisa Kulkarni MD  Emergency Medicine Resident    (Please note that portions of thisnote were completed with a voice recognition program.  Efforts were made to edit the dictations but occasionally words are mis-transcribed.)

## 2024-08-21 NOTE — DISCHARGE INSTRUCTIONS
Call today or tomorrow to follow up with Tim Oakes MD  in 2 days AND with the cardiologist as scheduled.    Please take all medications as prescribed.  You can also use ibuprofen or Tylenol (unless prescribed medications that have Tylenol in it) for pain.  You can take over the counter Ibuprofen (advil) tablets (4 every 8 hours or 3 every 6 hours or 2 every 4 hours)      Please return to the Emergency Department if you develop any symptoms that concern you, including the following: increasing pain, shortness of breath, swelling to your feet, unable to lay flat, vomiting, fevers, numbness, weakness, loss of bladder/bowel control, loss of consciousness or any other concerns.

## 2024-08-22 LAB
EKG ATRIAL RATE: 93 BPM
EKG P AXIS: 52 DEGREES
EKG P-R INTERVAL: 138 MS
EKG Q-T INTERVAL: 336 MS
EKG QRS DURATION: 78 MS
EKG QTC CALCULATION (BAZETT): 417 MS
EKG R AXIS: 37 DEGREES
EKG T AXIS: 21 DEGREES
EKG VENTRICULAR RATE: 93 BPM

## 2024-08-29 ASSESSMENT — ENCOUNTER SYMPTOMS
ABDOMINAL PAIN: 0
VOMITING: 0
BACK PAIN: 0
NAUSEA: 0
DIARRHEA: 0
COUGH: 0

## 2024-11-11 ENCOUNTER — APPOINTMENT (OUTPATIENT)
Dept: CT IMAGING | Age: 29
End: 2024-11-11
Payer: OTHER MISCELLANEOUS

## 2024-11-11 ENCOUNTER — HOSPITAL ENCOUNTER (EMERGENCY)
Age: 29
Discharge: HOME | End: 2024-11-11
Attending: EMERGENCY MEDICINE
Payer: OTHER MISCELLANEOUS

## 2024-11-11 VITALS
DIASTOLIC BLOOD PRESSURE: 97 MMHG | OXYGEN SATURATION: 96 % | HEART RATE: 83 BPM | WEIGHT: 169.75 LBS | BODY MASS INDEX: 26.59 KG/M2 | SYSTOLIC BLOOD PRESSURE: 114 MMHG | TEMPERATURE: 98.2 F | RESPIRATION RATE: 19 BRPM

## 2024-11-11 DIAGNOSIS — R11.2 NAUSEA AND VOMITING, UNSPECIFIED VOMITING TYPE: ICD-10-CM

## 2024-11-11 DIAGNOSIS — E87.6 HYPOKALEMIA: ICD-10-CM

## 2024-11-11 DIAGNOSIS — R42 DIZZINESS: Primary | ICD-10-CM
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
## 2024-11-11 LAB
ANION GAP SERPL CALCULATED.3IONS-SCNC: 16 MMOL/L (ref 9–16)
BASOPHILS # BLD: 0.06 K/UL (ref 0–0.2)
BASOPHILS NFR BLD: 1 % (ref 0–2)
BUN SERPL-MCNC: 9 MG/DL (ref 6–20)
CALCIUM SERPL-MCNC: 9.1 MG/DL (ref 8.6–10.4)
CHLORIDE SERPL-SCNC: 98 MMOL/L (ref 98–107)
CO2 SERPL-SCNC: 23 MMOL/L (ref 20–31)
CREAT SERPL-MCNC: 0.8 MG/DL (ref 0.6–0.9)
EOSINOPHIL # BLD: 0.12 K/UL (ref 0–0.44)
EOSINOPHILS RELATIVE PERCENT: 2 % (ref 1–4)
ERYTHROCYTE [DISTWIDTH] IN BLOOD BY AUTOMATED COUNT: 13.7 % (ref 11.8–14.4)
GFR, ESTIMATED: >90 ML/MIN/1.73M2
GLUCOSE SERPL-MCNC: 100 MG/DL (ref 74–99)
HCG SERPL QL: NEGATIVE
HCT VFR BLD AUTO: 40.5 % (ref 36.3–47.1)
HGB BLD-MCNC: 13.9 G/DL (ref 11.9–15.1)
IMM GRANULOCYTES # BLD AUTO: 0.03 K/UL (ref 0–0.3)
IMM GRANULOCYTES NFR BLD: 0 %
LYMPHOCYTES NFR BLD: 2.88 K/UL (ref 1.1–3.7)
LYMPHOCYTES RELATIVE PERCENT: 39 % (ref 24–43)
MCH RBC QN AUTO: 33.5 PG (ref 25.2–33.5)
MCHC RBC AUTO-ENTMCNC: 34.3 G/DL (ref 28.4–34.8)
MCV RBC AUTO: 97.6 FL (ref 82.6–102.9)
MONOCYTES NFR BLD: 0.65 K/UL (ref 0.1–1.2)
MONOCYTES NFR BLD: 9 % (ref 3–12)
NEUTROPHILS NFR BLD: 49 % (ref 36–65)
NEUTS SEG NFR BLD: 3.63 K/UL (ref 1.5–8.1)
NRBC BLD-RTO: 0 PER 100 WBC
PLATELET # BLD AUTO: 329 K/UL (ref 138–453)
PMV BLD AUTO: 9.6 FL (ref 8.1–13.5)
POTASSIUM SERPL-SCNC: 2.8 MMOL/L (ref 3.7–5.3)
POTASSIUM SERPL-SCNC: 3.4 MMOL/L (ref 3.7–5.3)
RBC # BLD AUTO: 4.15 M/UL (ref 3.95–5.11)
SODIUM SERPL-SCNC: 137 MMOL/L (ref 136–145)
TSH SERPL DL<=0.05 MIU/L-ACNC: 0.96 UIU/ML (ref 0.27–4.2)
WBC OTHER # BLD: 7.4 K/UL (ref 3.5–11.3)

## 2024-11-11 PROCEDURE — 85025 COMPLETE CBC W/AUTO DIFF WBC: CPT

## 2024-11-11 PROCEDURE — 84703 CHORIONIC GONADOTROPIN ASSAY: CPT

## 2024-11-11 PROCEDURE — 80048 BASIC METABOLIC PNL TOTAL CA: CPT

## 2024-11-11 PROCEDURE — 84443 ASSAY THYROID STIM HORMONE: CPT

## 2024-11-11 PROCEDURE — 96374 THER/PROPH/DIAG INJ IV PUSH: CPT

## 2024-11-11 PROCEDURE — 6370000000 HC RX 637 (ALT 250 FOR IP)

## 2024-11-11 PROCEDURE — 70450 CT HEAD/BRAIN W/O DYE: CPT

## 2024-11-11 PROCEDURE — 84132 ASSAY OF SERUM POTASSIUM: CPT

## 2024-11-11 PROCEDURE — 99284 EMERGENCY DEPT VISIT MOD MDM: CPT

## 2024-11-11 PROCEDURE — 96376 TX/PRO/DX INJ SAME DRUG ADON: CPT

## 2024-11-11 PROCEDURE — 6360000002 HC RX W HCPCS

## 2024-11-11 PROCEDURE — 93005 ELECTROCARDIOGRAM TRACING: CPT | Performed by: EMERGENCY MEDICINE

## 2024-11-11 RX ORDER — PENICILLIN V POTASSIUM 250 MG/1
500 TABLET, FILM COATED ORAL ONCE
Status: DISCONTINUED | OUTPATIENT
Start: 2024-11-11 | End: 2024-11-11

## 2024-11-11 RX ORDER — POTASSIUM CHLORIDE 1500 MG/1
20 TABLET, EXTENDED RELEASE ORAL DAILY
Qty: 5 TABLET | Refills: 0 | Status: SHIPPED | OUTPATIENT
Start: 2024-11-11 | End: 2024-11-16

## 2024-11-11 RX ORDER — ACETAMINOPHEN 325 MG/1
650 TABLET ORAL ONCE
Status: DISCONTINUED | OUTPATIENT
Start: 2024-11-11 | End: 2024-11-11

## 2024-11-11 RX ORDER — MECLIZINE HCL 12.5 MG 12.5 MG/1
25 TABLET ORAL ONCE
Status: COMPLETED | OUTPATIENT
Start: 2024-11-11 | End: 2024-11-11

## 2024-11-11 RX ORDER — ONDANSETRON 2 MG/ML
4 INJECTION INTRAMUSCULAR; INTRAVENOUS ONCE
Status: COMPLETED | OUTPATIENT
Start: 2024-11-11 | End: 2024-11-11

## 2024-11-11 RX ORDER — IBUPROFEN 400 MG/1
600 TABLET, FILM COATED ORAL ONCE
Status: DISCONTINUED | OUTPATIENT
Start: 2024-11-11 | End: 2024-11-11

## 2024-11-11 RX ADMIN — ONDANSETRON 4 MG: 2 INJECTION INTRAMUSCULAR; INTRAVENOUS at 08:51

## 2024-11-11 RX ADMIN — POTASSIUM BICARBONATE 40 MEQ: 782 TABLET, EFFERVESCENT ORAL at 09:46

## 2024-11-11 RX ADMIN — MECLIZINE 25 MG: 12.5 TABLET ORAL at 08:51

## 2024-11-11 RX ADMIN — ONDANSETRON 4 MG: 2 INJECTION INTRAMUSCULAR; INTRAVENOUS at 10:58

## 2024-11-11 ASSESSMENT — ENCOUNTER SYMPTOMS
NAUSEA: 1
COUGH: 0
VOMITING: 1
SHORTNESS OF BREATH: 0
BACK PAIN: 0
CHEST TIGHTNESS: 0

## 2024-11-11 ASSESSMENT — PAIN - FUNCTIONAL ASSESSMENT: PAIN_FUNCTIONAL_ASSESSMENT: 0-10

## 2024-11-11 NOTE — DISCHARGE INSTRUCTIONS
Please take your medications as advised.  Your Potassium was found to be low and one your medications Hydrochlorothiazide is stopped as it may be contributing to it.  Please follow with your PCP for medication adjustments regarding blood pressure.  Please return to hospital if you experience any chest pain, dizziness, syncope, nausea/vomiting, palpitations or weakness.
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
## 2024-11-12 LAB
EKG ATRIAL RATE: 95 BPM
EKG P AXIS: 48 DEGREES
EKG P-R INTERVAL: 138 MS
EKG Q-T INTERVAL: 352 MS
EKG QRS DURATION: 80 MS
EKG QTC CALCULATION (BAZETT): 442 MS
EKG R AXIS: 38 DEGREES
EKG T AXIS: 37 DEGREES
EKG VENTRICULAR RATE: 95 BPM

## 2025-05-30 ENCOUNTER — APPOINTMENT (OUTPATIENT)
Dept: ULTRASOUND IMAGING | Age: 30
End: 2025-05-30

## 2025-05-30 ENCOUNTER — HOSPITAL ENCOUNTER (EMERGENCY)
Age: 30
Discharge: HOME | End: 2025-05-30
Attending: EMERGENCY MEDICINE

## 2025-05-30 ENCOUNTER — APPOINTMENT (OUTPATIENT)
Dept: CT IMAGING | Age: 30
End: 2025-05-30

## 2025-05-30 VITALS
HEART RATE: 79 BPM | DIASTOLIC BLOOD PRESSURE: 89 MMHG | SYSTOLIC BLOOD PRESSURE: 125 MMHG | OXYGEN SATURATION: 100 % | TEMPERATURE: 98.2 F | RESPIRATION RATE: 18 BRPM

## 2025-05-30 DIAGNOSIS — G89.18 POSTOPERATIVE PAIN: Primary | ICD-10-CM

## 2025-05-30 LAB
ALBUMIN SERPL-MCNC: 4.6 G/DL (ref 3.5–5.2)
ALBUMIN/GLOB SERPL: 1.5 {RATIO} (ref 1–2.5)
ALP SERPL-CCNC: 69 U/L (ref 35–104)
ALT SERPL-CCNC: 32 U/L (ref 10–35)
ANION GAP SERPL CALCULATED.3IONS-SCNC: 17 MMOL/L (ref 9–16)
AST SERPL-CCNC: 39 U/L (ref 10–35)
BASOPHILS # BLD: 0.07 K/UL (ref 0–0.2)
BASOPHILS NFR BLD: 1 % (ref 0–2)
BILIRUB SERPL-MCNC: 0.3 MG/DL (ref 0–1.2)
BILIRUB UR QL STRIP: NEGATIVE
BUN SERPL-MCNC: 9 MG/DL (ref 6–20)
CALCIUM SERPL-MCNC: 9.6 MG/DL (ref 8.6–10.4)
CHLORIDE SERPL-SCNC: 107 MMOL/L (ref 98–107)
CLARITY UR: CLEAR
CO2 SERPL-SCNC: 16 MMOL/L (ref 20–31)
COLOR UR: YELLOW
COMMENT: ABNORMAL
CREAT SERPL-MCNC: 1.1 MG/DL (ref 0.6–0.9)
EOSINOPHIL # BLD: 0.17 K/UL (ref 0–0.44)
EOSINOPHILS RELATIVE PERCENT: 2 % (ref 1–4)
ERYTHROCYTE [DISTWIDTH] IN BLOOD BY AUTOMATED COUNT: 12.8 % (ref 11.8–14.4)
GFR, ESTIMATED: 70 ML/MIN/1.73M2
GLUCOSE SERPL-MCNC: 116 MG/DL (ref 74–99)
GLUCOSE UR STRIP-MCNC: NEGATIVE MG/DL
HCG SERPL QL: NEGATIVE
HCT VFR BLD AUTO: 41.7 % (ref 36.3–47.1)
HGB BLD-MCNC: 14 G/DL (ref 11.9–15.1)
HGB UR QL STRIP.AUTO: NEGATIVE
IMM GRANULOCYTES # BLD AUTO: <0.03 K/UL (ref 0–0.3)
IMM GRANULOCYTES NFR BLD: 0 %
KETONES UR STRIP-MCNC: NEGATIVE MG/DL
LACTIC ACID, WHOLE BLOOD: 2.1 MMOL/L (ref 0.7–2.1)
LEUKOCYTE ESTERASE UR QL STRIP: NEGATIVE
LIPASE SERPL-CCNC: 51 U/L (ref 13–60)
LYMPHOCYTES NFR BLD: 3.29 K/UL (ref 1.1–3.7)
LYMPHOCYTES RELATIVE PERCENT: 41 % (ref 24–43)
MCH RBC QN AUTO: 33.4 PG (ref 25.2–33.5)
MCHC RBC AUTO-ENTMCNC: 33.6 G/DL (ref 28.4–34.8)
MCV RBC AUTO: 99.5 FL (ref 82.6–102.9)
MONOCYTES NFR BLD: 0.57 K/UL (ref 0.1–1.2)
MONOCYTES NFR BLD: 7 % (ref 3–12)
NEUTROPHILS NFR BLD: 49 % (ref 36–65)
NEUTS SEG NFR BLD: 3.88 K/UL (ref 1.5–8.1)
NITRITE UR QL STRIP: NEGATIVE
NRBC BLD-RTO: 0 PER 100 WBC
PH UR STRIP: 6 [PH] (ref 5–8)
PLATELET # BLD AUTO: 316 K/UL (ref 138–453)
PMV BLD AUTO: 9.7 FL (ref 8.1–13.5)
POTASSIUM SERPL-SCNC: 3.7 MMOL/L (ref 3.7–5.3)
PROT SERPL-MCNC: 7.7 G/DL (ref 6.6–8.7)
PROT UR STRIP-MCNC: NEGATIVE MG/DL
RBC # BLD AUTO: 4.19 M/UL (ref 3.95–5.11)
SODIUM SERPL-SCNC: 140 MMOL/L (ref 136–145)
SP GR UR STRIP: 1 (ref 1–1.03)
UROBILINOGEN UR STRIP-ACNC: NORMAL EU/DL (ref 0–1)
WBC OTHER # BLD: 8 K/UL (ref 3.5–11.3)

## 2025-05-30 PROCEDURE — 76830 TRANSVAGINAL US NON-OB: CPT

## 2025-05-30 PROCEDURE — 83605 ASSAY OF LACTIC ACID: CPT

## 2025-05-30 PROCEDURE — 2580000003 HC RX 258

## 2025-05-30 PROCEDURE — 85025 COMPLETE CBC W/AUTO DIFF WBC: CPT

## 2025-05-30 PROCEDURE — 80053 COMPREHEN METABOLIC PANEL: CPT

## 2025-05-30 PROCEDURE — 6360000002 HC RX W HCPCS

## 2025-05-30 PROCEDURE — 84703 CHORIONIC GONADOTROPIN ASSAY: CPT

## 2025-05-30 PROCEDURE — 96374 THER/PROPH/DIAG INJ IV PUSH: CPT

## 2025-05-30 PROCEDURE — 83690 ASSAY OF LIPASE: CPT

## 2025-05-30 PROCEDURE — 93976 VASCULAR STUDY: CPT

## 2025-05-30 PROCEDURE — 74177 CT ABD & PELVIS W/CONTRAST: CPT

## 2025-05-30 PROCEDURE — 81003 URINALYSIS AUTO W/O SCOPE: CPT

## 2025-05-30 PROCEDURE — 99285 EMERGENCY DEPT VISIT HI MDM: CPT

## 2025-05-30 PROCEDURE — 6360000004 HC RX CONTRAST MEDICATION

## 2025-05-30 RX ORDER — ONDANSETRON 2 MG/ML
4 INJECTION INTRAMUSCULAR; INTRAVENOUS ONCE
Status: COMPLETED | OUTPATIENT
Start: 2025-05-30 | End: 2025-05-30

## 2025-05-30 RX ORDER — IOPAMIDOL 755 MG/ML
75 INJECTION, SOLUTION INTRAVASCULAR
Status: COMPLETED | OUTPATIENT
Start: 2025-05-30 | End: 2025-05-30

## 2025-05-30 RX ORDER — 0.9 % SODIUM CHLORIDE 0.9 %
1000 INTRAVENOUS SOLUTION INTRAVENOUS ONCE
Status: COMPLETED | OUTPATIENT
Start: 2025-05-30 | End: 2025-05-30

## 2025-05-30 RX ORDER — KETOROLAC TROMETHAMINE 30 MG/ML
30 INJECTION, SOLUTION INTRAMUSCULAR; INTRAVENOUS ONCE
Status: COMPLETED | OUTPATIENT
Start: 2025-05-30 | End: 2025-05-30

## 2025-05-30 RX ADMIN — SODIUM CHLORIDE 1000 ML: 9 INJECTION, SOLUTION INTRAVENOUS at 19:20

## 2025-05-30 RX ADMIN — ONDANSETRON 4 MG: 2 INJECTION, SOLUTION INTRAMUSCULAR; INTRAVENOUS at 17:37

## 2025-05-30 RX ADMIN — KETOROLAC TROMETHAMINE 30 MG: 30 INJECTION, SOLUTION INTRAMUSCULAR at 17:37

## 2025-05-30 RX ADMIN — IOPAMIDOL 75 ML: 755 INJECTION, SOLUTION INTRAVENOUS at 19:05

## 2025-05-30 ASSESSMENT — ENCOUNTER SYMPTOMS
NAUSEA: 1
SHORTNESS OF BREATH: 0
VOMITING: 0
ABDOMINAL PAIN: 1

## 2025-05-30 ASSESSMENT — LIFESTYLE VARIABLES
HOW OFTEN DO YOU HAVE A DRINK CONTAINING ALCOHOL: MONTHLY OR LESS
HOW MANY STANDARD DRINKS CONTAINING ALCOHOL DO YOU HAVE ON A TYPICAL DAY: 1 OR 2

## 2025-05-30 ASSESSMENT — PAIN DESCRIPTION - LOCATION: LOCATION: ABDOMEN

## 2025-05-30 ASSESSMENT — PAIN - FUNCTIONAL ASSESSMENT: PAIN_FUNCTIONAL_ASSESSMENT: 0-10

## 2025-05-30 ASSESSMENT — PAIN SCALES - GENERAL
PAINLEVEL_OUTOF10: 5
PAINLEVEL_OUTOF10: 7

## 2025-05-30 ASSESSMENT — PAIN DESCRIPTION - DESCRIPTORS: DESCRIPTORS: ACHING

## 2025-05-30 ASSESSMENT — PAIN DESCRIPTION - ORIENTATION: ORIENTATION: MID

## 2025-05-30 NOTE — ED PROVIDER NOTES
[facility]   Emergency Department  Faculty Attestation       I performed a history and physical examination of the patient and discussed management with the resident. I reviewed the resident’s note and agree with the documented findings including all diagnostic interpretations and plan of care. Any areas of disagreement are noted on the chart. I was personally present for the key portions of any procedures. I have documented in the chart those procedures where I was not present during the key portions. I have reviewed the emergency nurses triage note. I agree with the chief complaint, past medical history, past surgical history, allergies, medications, social and family history as documented unless otherwise noted below.  For Physician Assistant/ Nurse Practitioner cases/documentation I have personally evaluated this patient and have completed at least one if not all key elements of the E/M (history, physical exam, and MDM). Additional findings are as noted.    Patient Name: Ena Gillettework  MRN: [id number]  : 1995  Primary Care Physician: Tim Oakes MD    Date of evaluationa: 2025   Note Started: 5:35 PM EDT    Pertinent Comments     Chief Complaint:   Chief Complaint   Patient presents with    Abdominal Pain        Initial vitals: (If not listed, please see nursing documentation)  ED Triage Vitals [25 1647]   BP Systolic BP Percentile Diastolic BP Percentile Temp Temp src Pulse Respirations SpO2   (!) 149/101 -- -- 98.2 °F (36.8 °C) -- (!) 114 18 98 %      Height Weight         -- --              HPI/PE/Impression:  This is a 30 y.o. female who presents to the Emergency Department w/ RLQ pain for 1 week that radiates to right flank.  Had an appendectomy ~ 3 weeks ago at [facility].  Has had nausea but no vomiting.      Medical Decision Making  Amount and/or Complexity of Data Reviewed  Labs: ordered.  Radiology: ordered.    Risk  Prescription drug management.

## 2025-05-30 NOTE — ED PROVIDER NOTES
[facility] EMERGENCY DEPARTMENT  Emergency Department Encounter  Emergency Medicine Resident     Pt Name:Ena Gleason  MRN: [id number]  Birthdate 1995  Date of evaluation: 5/30/25  PCP:  Tim Oakes MD  Note Started: 6:14 PM EDT      CHIEF COMPLAINT       Chief Complaint   Patient presents with    Abdominal Pain       HISTORY OF PRESENT ILLNESS  (Location/Symptom, Timing/Onset, Context/Setting, Quality, Duration, Modifying Factors, Severity.)      Ena Gleason is a 30 y.o. female with past medical history of IBS, hypertension, high cholesterol who presents with right lower quadrant abdominal pain that radiates into her umbilicus that has been ongoing for approximately 1 week.  She reports it is intermittent but describes it as a sharp and shooting pain.  Patient reports that she had an appendectomy performed at [facility] 3 weeks ago and was doing well up until this pain restarted.  She reports that the pain made her feel nauseous this morning but she has not had any episodes of vomiting.  She reports normal bowel movements.  No urinary symptoms.  No chest pain or shortness of breath.  No recent cold-like symptoms.  No unusual vaginal discharge or bleeding.  She did not take anything at home prior to coming to the emergency department for the pain.  She has no prior abdominal surgeries other than the appendectomy.    PAST MEDICAL / SURGICAL / SOCIAL / FAMILY HISTORY      has a past medical history of Anxiety, Asthma, and IBS (irritable bowel syndrome).       has a past surgical history that includes [place] tooth extraction.      Social History     Socioeconomic History    Marital status: Single     Spouse name: Not on file    Number of children: Not on file    Years of education: Not on file    Highest education level: Not on file   Occupational History    Not on file   Tobacco Use    Smoking status: Some Days     Types: Cigarettes    Smokeless tobacco: Never    Tobacco comments:  identified in the abdomen or pelvis. [JR]   2156 Pelvic ultrasound:  1. Unremarkable exam. [JR]   2156 Patient was reevaluated updated results and plan of care.  Discussed that this is most likely incisional pain as she has no signs of infection or postop complication and no other abnormalities were noted.  She expressed understanding was agreeable with plan of care.  Strict return precautions are provided.  Discharged home in stable condition. [JR]      ED Course User Index  [JR] Benita Machuca MD       PROCEDURES:  None    CONSULTS:  None    CRITICAL CARE:  There was significant risk of life threatening deterioration of patient's condition requiring my direct management. Critical care time 0 minutes, excluding any documented procedures.    FINAL IMPRESSION      1. Postoperative pain          DISPOSITION / PLAN     DISPOSITION Decision To Discharge 05/30/2025 10:00:47 PM   DISPOSITION CONDITION Stable           PATIENT REFERRED TO:  Tim Oakes MD  [facility] [facility] [id number]  [phone]            DISCHARGE MEDICATIONS:  Discharge Medication List as of 5/30/2025 10:00 PM          Benita Machuca MD  Emergency Medicine Resident    (Please note that portions of thisnote were completed with a voice recognition program.  Efforts were made to edit the dictations but occasionally words are mis-transcribed.)

## 2025-05-30 NOTE — ED NOTES
Pt to ED via triage with complaints of right lower abdominal pain. She had her appendix removed on 5/04. Pt denies any issues with surgery, and her incisions are healing well. Pt states she has intermittent lower right abdominal pain. Pt is on stretcher with call light.

## 2025-05-31 NOTE — DISCHARGE INSTRUCTIONS
Thank you for visiting [facility] Emergency Department.    Your CT scan did not show any signs of infection.  No postoperative infection.  Your ultrasound did not show any abnormalities with your uterus or ovaries.  This is most likely pain related to your incisions and the movement that they caused.  Please continue to take Motrin and Tylenol as needed for pain management.  Follow-up with your surgeon as scheduled.  Return to the emergency department for any significantly worsening signs or symptoms.    You need to call Tim Oakes MD to make an appointment as directed for follow up.    Should you have any questions regarding your care or further treatment, please call [facility] Emergency Department at [phone].